# Patient Record
Sex: FEMALE | Race: WHITE | Employment: FULL TIME | URBAN - METROPOLITAN AREA
[De-identification: names, ages, dates, MRNs, and addresses within clinical notes are randomized per-mention and may not be internally consistent; named-entity substitution may affect disease eponyms.]

---

## 2018-10-15 LAB
HPV COMMENT: NORMAL
HPV TYPE 16: NOT DETECTED
HPV TYPE 18: NOT DETECTED
HPVOH (OTHER TYPES): NOT DETECTED

## 2021-01-20 ENCOUNTER — TELEPHONE (OUTPATIENT)
Dept: INTERNAL MEDICINE CLINIC | Age: 36
End: 2021-01-20

## 2021-01-20 NOTE — TELEPHONE ENCOUNTER
----- Message from Bryon Trevizomaximo sent at 1/20/2021  9:30 AM EST -----  Subject: Message to Provider    QUESTIONS  Information for Provider? Pt called and said she would like to schedule a   new pt appointment with  informed her she wasn't accepting at this   time. She said because she was a previous pt of hers if she would see her   again.  ---------------------------------------------------------------------------  --------------  CALL BACK INFO  What is the best way for the office to contact you? OK to leave message on   voicemail  Preferred Call Back Phone Number? [phone]  ---------------------------------------------------------------------------  --------------  SCRIPT ANSWERS  Relationship to Patient?  Self

## 2021-02-12 NOTE — TELEPHONE ENCOUNTER
Patient states Dr. Leonard Millan informed her mother via a VV this week, that she could do a NP VV to re-establish either this coming Tuesday or Next. Please advise regarding this, and what time patient should be scheduled as there are not 45 minutes appointments available on those days.

## 2021-02-15 NOTE — PROGRESS NOTES
Date of Visit:  2021    CC: Dalton Amaro (: 1985) is a 37 y.o. female, new patient, here for evaluation/re-evaluation of the following medical concerns:    ASSESSMENT/PLAN:  1. Essential hypertension  Assessment & Plan:  Inadequately controlled on current regimen. Since planning to have more children in the near future, will avoid ACE inhibitors and ARBs. Increase labetalol to 300 mg bid. She will come in for BP check and home cuff calibration in about 2 weeks. If still above goal, will increase to 400 mg bid, which is maximal dose. If additional agent needed, consider amlodipine or diuretic. Orders:  -     Comprehensive Metabolic Panel, Fasting; Future  -     TSH with Reflex; Future  2. Hyperlipidemia, unspecified hyperlipidemia type  Assessment & Plan:  Familial- will likely need medication after childbearing complete. Patient counseled on diet and exercise. Orders:  -     Lipid, Fasting; Future  3. Non-seasonal allergic rhinitis due to other allergic trigger  Assessment & Plan:  Inadequately controlled on nasal steroid alone. Suggested adding daily Claritin, but she would also appreciate consult with ENT- referral provided. Orders:  -     Adan Damon MD, Otolaryngology, [facility]  4. Acute non-recurrent sinusitis, unspecified location  Likely due to above. Although she has had a generalized reaction to PCN, she does tolerate cephalosporins. Since she is breastfeeding, will treat with Omnicef 600 mg qd. Patient was advised to take probiotic, such as DanActiv yogurt drink, Florastor or Culturelle, twice daily while on antibiotics and for 1 week after. She will call the office at the first sign of any allergic reaction to this medication, or 911 if any swelling of the mouth or throat or any SOB. Return in about 2 weeks (around 3/2/2021) for NURSE VISIT- BP check and cuff calibration.      Patient-Reported Vitals 2021   Patient-Reported Weight 240   Patient-Reported Systolic 833   Patient-Reported Diastolic 99   Patient-Reported Pulse 98        Wt Readings from Last 3 Encounters:   13 233 lb (105.7 kg)   12 222 lb (100.7 kg)   12 222 lb (100.7 kg)     BP Readings from Last 3 Encounters:   13 132/90   12 148/80   12 122/78     Estimated body mass index is 36.22 kg/m² as calculated from the following:    Height as of 3/16/12: 5' 7.25\" (1.708 m). Weight as of 13: 233 lb (105.7 kg). HPI  HTN:  Long history of borderline HTN, which first required medication about 2 years ago- losartan, switched to labetalol when started trying to conceive- 5 months postpartum now. Currently taking 200 mg bid, but BP still running 130-150s/80-90s. Was taking 400 mg bid during pregnancy. No other complications of pregnancy except heavy postpartum bleeding. Having intermittent HAs, ZIMMER, but no CP, dizziness, edema or visual changes. Significant FH of hypertension. Allergic Rhinitis:  Current treatment includes intranasal steroid- Flonase. Residual symptoms: clear rhinorrhea, headaches, itchy eyes, nasal congestion, pressure sensation in ears and sinus pressure. Has had greenish nasal discharge for the past 2 weeks. Currently breastfeeding. Hyperlipidemia:  Recently diagnosed with familial hyperlipidemia- saw lipid specialist who recommended waiting to treat until childbearing complete. Father and brother on aggressive lipid-lowering medication regimen. PGF  of MI at 43. Patient trying to follow Mediterranean Diet, but not able to exercise much. ROS  As documented above    Physical Exam  Constitutional:       General: She is not in acute distress. Appearance: She is well-developed. HENT:      Head: Normocephalic and atraumatic. Mouth/Throat:      Lips: No lesions. Neck:      Comments: No visible mass  Pulmonary:      Effort: Pulmonary effort is normal. No respiratory distress.    Skin:     Comments: No rash, erythema or other discoloration on facial skin and exposed areas of neck and upper extremites    Neurological:      Mental Status: She is alert. Comments: No facial asymmetry (cranial nerve 7 motor function) or gaze palsy   Psychiatric:         Attention and Perception: Attention normal.         Mood and Affect: Mood normal.         Speech: Speech normal.         Behavior: Behavior normal.         Thought Content: Thought content normal.         Cognition and Memory: Cognition normal.       On this date 02/16/21 I have spent 60 minutes reviewing previous notes, test results and face to face with the patient discussing the diagnosis and importance of compliance with the treatment plan as well as documenting on the day of the visit. Adrien Mccord is a 37 y.o. female being evaluated by a Virtual Visit (video visit) encounter to address concerns as mentioned above. A caregiver was present when appropriate. Due to this being a TeleHealth encounter (During ODKSE-28 public health emergency), evaluation of the following organ systems was limited: Vitals/Constitutional/EENT/Resp/CV/GI//MS/Neuro/Skin/Heme-Lymph-Imm. Pursuant to the emergency declaration under the [address] [place] [address] authority and the [employer] and [employer] Act, this Virtual Visit was conducted with patient's (and/or legal guardian's) consent, to reduce the patient's risk of exposure to COVID-19 and provide necessary medical care. The patient (and/or legal guardian) has also been advised to contact this office for worsening conditions or problems, and seek emergency medical treatment and/or call 911 if deemed necessary. Patient identification was verified at the start of the visit: Yes    Services were provided through a video synchronous discussion virtually to substitute for in-person clinic visit. Patient and provider were located at their individual homes.     --George Castro [address] MD on 2/16/2021 at 12:18 PM    An electronic signature was used to authenticate this note.

## 2021-02-16 ENCOUNTER — VIRTUAL VISIT (OUTPATIENT)
Dept: INTERNAL MEDICINE CLINIC | Age: 36
End: 2021-02-16
Payer: OTHER GOVERNMENT

## 2021-02-16 DIAGNOSIS — I10 ESSENTIAL HYPERTENSION: Primary | ICD-10-CM

## 2021-02-16 DIAGNOSIS — J30.89 NON-SEASONAL ALLERGIC RHINITIS DUE TO OTHER ALLERGIC TRIGGER: Chronic | ICD-10-CM

## 2021-02-16 DIAGNOSIS — E78.5 HYPERLIPIDEMIA, UNSPECIFIED HYPERLIPIDEMIA TYPE: ICD-10-CM

## 2021-02-16 DIAGNOSIS — J01.90 ACUTE NON-RECURRENT SINUSITIS, UNSPECIFIED LOCATION: ICD-10-CM

## 2021-02-16 PROCEDURE — 99204 OFFICE O/P NEW MOD 45 MIN: CPT | Performed by: INTERNAL MEDICINE

## 2021-02-16 RX ORDER — CEFDINIR 300 MG/1
300 CAPSULE ORAL 2 TIMES DAILY
Qty: 20 CAPSULE | Refills: 0 | Status: SHIPPED | OUTPATIENT
Start: 2021-02-16 | End: 2021-02-26

## 2021-02-16 RX ORDER — LABETALOL 100 MG/1
300 TABLET, FILM COATED ORAL 2 TIMES DAILY
COMMUNITY
End: 2021-05-11 | Stop reason: SDUPTHER

## 2021-02-16 ASSESSMENT — PATIENT HEALTH QUESTIONNAIRE - PHQ9
SUM OF ALL RESPONSES TO PHQ QUESTIONS 1-9: 1
SUM OF ALL RESPONSES TO PHQ9 QUESTIONS 1 & 2: 1
2. FEELING DOWN, DEPRESSED OR HOPELESS: 1
SUM OF ALL RESPONSES TO PHQ QUESTIONS 1-9: 1
1. LITTLE INTEREST OR PLEASURE IN DOING THINGS: 0

## 2021-02-16 NOTE — ASSESSMENT & PLAN NOTE
Familial- will likely need medication after childbearing complete. Patient counseled on diet and exercise.

## 2021-02-16 NOTE — ASSESSMENT & PLAN NOTE
Inadequately controlled on nasal steroid alone. Suggested adding daily Claritin, but she would also appreciate consult with ENT- referral provided.

## 2021-03-03 ENCOUNTER — NURSE ONLY (OUTPATIENT)
Dept: INTERNAL MEDICINE CLINIC | Age: 36
End: 2021-03-03

## 2021-03-03 VITALS — DIASTOLIC BLOOD PRESSURE: 90 MMHG | SYSTOLIC BLOOD PRESSURE: 144 MMHG | HEART RATE: 62 BPM

## 2021-03-03 DIAGNOSIS — I10 ESSENTIAL HYPERTENSION: Primary | ICD-10-CM

## 2021-03-03 NOTE — PROGRESS NOTES
Patient advised and will continue current medication. She has follow up appointment scheduled for mid April which she would like to keep.

## 2021-03-03 NOTE — PROGRESS NOTES
BP looks good on office cuff today- continue current dose of labetalol. However, her home cuff is inaccurate, so should be replaced. I need to see her back in about 3 months- have her bring new cuff to this appointment.

## 2021-03-08 ENCOUNTER — OFFICE VISIT (OUTPATIENT)
Dept: ENT CLINIC | Age: 36
End: 2021-03-08
Payer: OTHER GOVERNMENT

## 2021-03-08 VITALS — HEART RATE: 87 BPM | SYSTOLIC BLOOD PRESSURE: 142 MMHG | TEMPERATURE: 97.5 F | DIASTOLIC BLOOD PRESSURE: 99 MMHG

## 2021-03-08 DIAGNOSIS — J34.89 NASAL OBSTRUCTION: Primary | ICD-10-CM

## 2021-03-08 DIAGNOSIS — J35.01 CHRONIC TONSILLITIS: ICD-10-CM

## 2021-03-08 DIAGNOSIS — H93.8X3 SENSATION OF FULLNESS IN BOTH EARS: ICD-10-CM

## 2021-03-08 DIAGNOSIS — J31.0 CHRONIC RHINITIS: ICD-10-CM

## 2021-03-08 DIAGNOSIS — J32.0 CHRONIC MAXILLARY SINUSITIS: ICD-10-CM

## 2021-03-08 PROCEDURE — 99244 OFF/OP CNSLTJ NEW/EST MOD 40: CPT | Performed by: STUDENT IN AN ORGANIZED HEALTH CARE EDUCATION/TRAINING PROGRAM

## 2021-03-08 PROCEDURE — 31231 NASAL ENDOSCOPY DX: CPT | Performed by: STUDENT IN AN ORGANIZED HEALTH CARE EDUCATION/TRAINING PROGRAM

## 2021-03-08 RX ORDER — DOXYCYCLINE HYCLATE 100 MG
100 TABLET ORAL 2 TIMES DAILY
Qty: 20 TABLET | Refills: 0 | Status: SHIPPED | OUTPATIENT
Start: 2021-03-08 | End: 2021-03-18

## 2021-03-08 NOTE — PROGRESS NOTES
[facility] NECK SURGERY  CONSULT      Juliano Benitez (:  1985) is a 37 y.o. female, here for evaluation of the following chief complaint(s):  Sinus Problem (multiple sinus infections, snores, drainage, congestion, ear pain, facial pressure)      ASSESSMENT/PLAN:  1. Nasal obstruction  2. Chronic maxillary sinusitis  -     CT SINUS FOR IMAGE GUIDANCE; Future  3. Chronic rhinitis  4. Sensation of fullness in both ears  5. Chronic tonsillitis      This is a very pleasant 37 y.o. female here today for evaluation of the the above-noted complaints. On exam the patient has evidence of moderate sinonasal inflammation with purulence in the right middle meatus. I would like to prescribe her a new antibiotic, doxycycline 100 mg to be used twice daily. I will plan to obtain a posttreatment CT scan given her history of chronic sinus infections and exam findings. Depending on what this shows we can discuss further medical and/or surgical therapies. Plan to monitor her chronic tonsillitis going forward, and should her symptoms worsen we could discuss further treatment options. The risks, benefits and alternatives to antibiotics were discussed with patient in detail including but not limited to the risk of GI upset, xerostomia, anaphylaxis and rash/ sun sensitivity. The patient was instructed to contact me should they develop any adverse reactions or have other concerns. SUBJECTIVE/OBJECTIVE:  HPI    Juliano Benitez is here today for evaluation of issues related to her nose. The patient states that she gets multiple sinus infections per year. These usually last for several weeks at a time. She was recently seen by her primary care physician and prescribed Omnicef but feels like she is still symptomatic. She has difficulty breathing through her nose. She gets intermittent nasal drainage which can alternate from clear to greenish-yellow.   When she gets her sinus infections she also gets a sensation of fullness in her ears. She does not get nosebleeds. She has never had sinus imaging or nasal surgery. She has not use any medications in her nose. The patient also notes a history of chronic tonsil infections and tonsil stones. This is been going on for many years. Previous primary care doctor told her that she was not a candidate for tonsillectomy based on her age. REVIEW OF SYSTEMS  The following systems were reviewed and revealed the following in addition to any already discussed in the HPI:    PHYSICAL EXAM    GENERAL: No acute distress, alert and oriented, no hoarseness, strong voice  EYES: EOMI, Anti-icteric  HENT:   Head: Normocephalic and atraumatic. Face:  Symmetric, facial nerve intact, no sinus tenderness  Right Ear: Normal external ear, normal external auditory canal, intact tympanic membrane with normal mobility and aerated middle ear  Left Ear: Normal external ear, normal external auditory canal, intact tympanic membrane with normal mobility and aerated middle ear  Mouth/Oral Cavity:  normal lips, Uvula is midline, no mucosal lesions, no trismus, normal dentition, normal salivary quality/flow  Oropharynx/Larynx:  normal oropharynx, 2+ tonsils; patient did not tolerate mirror exam due to excessive gag reflex  Nose:Normal external nasal appearance. Anterior rhinoscopy shows  a deviated septum preventing view posteriorly. Mild hypertrophy turbinates. Normal mucosa   NECK: Normal range of motion, no thyromegaly, trachea is midline, no lymphadenopathy, no neck masses, no crepitus  CHEST: Normal respiratory effort, no retractions, breathing comfortably  SKIN: No rashes, normal appearing skin, no evidence of skin lesions/tumors  Neuro:  cranial nerve II-XII intact; normal gait  Cardio:  no edema        PROCEDURE  Nasal Endoscopy (CPT code 92882)    Preop: chronic rhinitis  Postop: Same    Verbal consent was received.  After topical anesthesia and decongestion had been obtained using aerosolized 1% lidocaine and oxymetazoline, a 45 degree rigid endoscope was placed into both nares with the patient in a sitting position. The following was observed:    Right Nasal Cavity and Paranasal Sinuses:  Polyp score = 0 (0 = no polyps, 1 = small polyps in middle meatus not reaching below the inferior border of the middle radu, 2 = polyps reaching below the middle border of the middle turbinate, 3= large polyps reaching the lower border of the inferior turbinate or polyps medial to the middle radu, 4= large polyps causing almost complete congestion/obstruction of the interior meatus)  Edema score = 1 (0 = absent, 1 = mild, 2 = severe)  Discharge score = 2 (0 = no discharge, 1 = clear thin discharge, 2 = thick purulent discharge)    Left Nasal Cavity and Paranasal Sinuses:    Polyp score = 0 (0 = no polyps, 1 = small polyps in middle meatus not reaching below the inferior border of the middle radu, 2 = polyps reaching below the middle border of the middle turbinate, 3= large polyps reaching the lower border of the inferior turbinate or polyps medial to the middle radu, 4= large polyps causing almost complete congestion/obstruction of the interior meatus)  Edema score = 1 (0 = absent, 1 = mild, 2 = severe)  Discharge score = 1 (0 = no discharge, 1 = clear thin discharge, 2 = thick purulent discharge)    Septum: intact and deviated high septal deviation with swell body  Other:   -The inferior and middle turbinates were examined. The middle meatus, and sphenoethmoid recess was examined bilaterally.    -There is evidence of purulence in the right middle meatus. No evidence of polyps mild turbinate hypertrophy.   -There were no complications. Tolerated well without complication. I attest that I was present for and did the entire procedure myself. This note was generated completely or in part utilizing Dragon dictation speech recognition software.   Occasionally, words are mistranscribed and despite editing, the text may contain inaccuracies due to incorrect word recognition. If further clarification is needed please contact the office at [phone]. An electronic signature was used to authenticate this note.     --Dung Guerrier MD

## 2021-03-11 ENCOUNTER — HOSPITAL ENCOUNTER (OUTPATIENT)
Dept: CT IMAGING | Age: 36
Discharge: HOME | End: 2021-03-11
Payer: OTHER GOVERNMENT

## 2021-03-11 DIAGNOSIS — J32.0 CHRONIC MAXILLARY SINUSITIS: ICD-10-CM

## 2021-03-11 PROCEDURE — 70486 CT MAXILLOFACIAL W/O DYE: CPT

## 2021-03-16 ENCOUNTER — OFFICE VISIT (OUTPATIENT)
Dept: ENT CLINIC | Age: 36
End: 2021-03-16
Payer: OTHER GOVERNMENT

## 2021-03-16 VITALS — DIASTOLIC BLOOD PRESSURE: 106 MMHG | SYSTOLIC BLOOD PRESSURE: 156 MMHG | TEMPERATURE: 97.7 F | HEART RATE: 77 BPM

## 2021-03-16 DIAGNOSIS — H93.8X3 SENSATION OF FULLNESS IN BOTH EARS: ICD-10-CM

## 2021-03-16 DIAGNOSIS — J32.2 CHRONIC ETHMOIDAL SINUSITIS: ICD-10-CM

## 2021-03-16 DIAGNOSIS — J34.89 NASAL OBSTRUCTION: Primary | ICD-10-CM

## 2021-03-16 DIAGNOSIS — J35.01 CHRONIC TONSILLITIS: ICD-10-CM

## 2021-03-16 DIAGNOSIS — J31.0 CHRONIC RHINITIS: ICD-10-CM

## 2021-03-16 DIAGNOSIS — J34.89 NASAL VALVE COLLAPSE: ICD-10-CM

## 2021-03-16 PROCEDURE — 99214 OFFICE O/P EST MOD 30 MIN: CPT | Performed by: STUDENT IN AN ORGANIZED HEALTH CARE EDUCATION/TRAINING PROGRAM

## 2021-03-16 RX ORDER — FLUTICASONE PROPIONATE 50 MCG
2 SPRAY, SUSPENSION (ML) NASAL 2 TIMES DAILY
Qty: 2 BOTTLE | Refills: 5 | Status: ON HOLD | OUTPATIENT
Start: 2021-03-16 | End: 2021-06-14 | Stop reason: HOSPADM

## 2021-03-16 NOTE — PROGRESS NOTES
[address]  (:  1985) is a 37 y.o. female, here for evaluation of the following chief complaint(s): Other (CT Results )      ASSESSMENT/PLAN:  1. Nasal obstruction  2. Chronic rhinitis  3. Sensation of fullness in both ears  4. Chronic tonsillitis  5. Chronic ethmoidal sinusitis  6. Nasal valve collapse      This is a very pleasant 37 y.o. female here today for evaluation of the the above-noted complaints. I independently reviewed her post treatment CT sinus scan with her today and it shows evidence of bilateral ethmoid sinus inflammation as well as a right-sided septal deviation and inferior turbinate hypertrophy. The ostiomeatal complexes are also obstructed bilaterally right greater than left. Additionally, with a modified caudal maneuver she experienced improvement of her nasal breathing bilaterally left greater than right. We discussed different treatment options for her symptoms and decided to proceed with conservative medical therapy of twice daily sinus irrigations and nasal steroid sprays. Depending on how she does with this, we could consider her for surgery, but I would like to try to manage her medically at this point. If she were to need to get surgery then she would need bilateral sinus surgery, septoplasty, inferior turbinate reduction and open septorhinoplasty to address her narrow nasal valves. Plan to monitor her chronic tonsillitis going forward, and should her symptoms worsen we could discuss further treatment options. The risks, benefits and alternatives to intranasal steroid use were discussed with the patient in detail, including the risks of burning, dryness, crusting, and occasional nosebleeds. Additional rare risks include septal perforations, mucosal atrophy, contact dermatitis as well as the potential risk of glaucoma or cataracts with sustained and prolonged use.  The patient expressed understanding of the risks and wished to proceed with therapy. SUBJECTIVE/OBJECTIVE:  [facility]    Juan Antonio Alonzo is here today for evaluation of issues related to her nose. The patient states that she gets multiple sinus infections per year. These usually last for several weeks at a time. She was recently seen by her primary care physician and prescribed Omnicef but feels like she is still symptomatic. She has difficulty breathing through her nose. She gets intermittent nasal drainage which can alternate from clear to greenish-yellow. When she gets her sinus infections she also gets a sensation of fullness in her ears. She does not get nosebleeds. She has never had sinus imaging or nasal surgery. She has not use any medications in her nose. The patient also notes a history of chronic tonsil infections and tonsil stones. This is been going on for many years. Previous primary care doctor told her that she was not a candidate for tonsillectomy based on her age. Update 3/16/2021:    Patient presents today for follow-up. She is still experiencing some difficulties breathing through her nose greater on the left than the right. She gets some consistent nasal drainage. Her symptoms are not improved since she had antibiotics. REVIEW OF SYSTEMS  The following systems were reviewed and revealed the following in addition to any already discussed in the HPI:    PHYSICAL EXAM    GENERAL: No acute distress, alert and oriented, no hoarseness, strong voice  EYES: EOMI, Anti-icteric  HENT:   Head: Normocephalic and atraumatic.    Face:  Symmetric, facial nerve intact, no sinus tenderness  Right Ear: Normal external ear, normal external auditory canal, intact tympanic membrane with normal mobility and aerated middle ear  Left Ear: Normal external ear, normal external auditory canal, intact tympanic membrane with normal mobility and aerated middle ear  Mouth/Oral Cavity:  normal lips, Uvula is midline, no mucosal lesions, no trismus, normal dentition, normal salivary quality/flow  Oropharynx/Larynx:  normal oropharynx, 2+ tonsils; patient did not tolerate mirror exam due to excessive gag reflex  Nose:Normal external nasal appearance. Anterior rhinoscopy shows  a deviated septum preventing view posteriorly. Mild hypertrophy turbinates. Normal mucosa   NECK: Normal range of motion, no thyromegaly, trachea is midline, no lymphadenopathy, no neck masses, no crepitus  CHEST: Normal respiratory effort, no retractions, breathing comfortably  SKIN: No rashes, normal appearing skin, no evidence of skin lesions/tumors  Neuro:  cranial nerve II-XII intact; normal gait  Cardio:  no edema        PROCEDURE  Nasal Endoscopy (CPT code 57827) from initial visit    Preop: chronic rhinitis  Postop: Same    Verbal consent was received. After topical anesthesia and decongestion had been obtained using aerosolized 1% lidocaine and oxymetazoline, a 45 degree rigid endoscope was placed into both nares with the patient in a sitting position.  The following was observed:    Right Nasal Cavity and Paranasal Sinuses:  Polyp score = 0 (0 = no polyps, 1 = small polyps in middle meatus not reaching below the inferior border of the middle radu, 2 = polyps reaching below the middle border of the middle turbinate, 3= large polyps reaching the lower border of the inferior turbinate or polyps medial to the middle radu, 4= large polyps causing almost complete congestion/obstruction of the interior meatus)  Edema score = 1 (0 = absent, 1 = mild, 2 = severe)  Discharge score = 2 (0 = no discharge, 1 = clear thin discharge, 2 = thick purulent discharge)    Left Nasal Cavity and Paranasal Sinuses:    Polyp score = 0 (0 = no polyps, 1 = small polyps in middle meatus not reaching below the inferior border of the middle radu, 2 = polyps reaching below the middle border of the middle turbinate, 3= large polyps reaching the lower border of the inferior turbinate

## 2021-05-03 ENCOUNTER — OFFICE VISIT (OUTPATIENT)
Dept: ENT CLINIC | Age: 36
End: 2021-05-03
Payer: OTHER GOVERNMENT

## 2021-05-03 VITALS — SYSTOLIC BLOOD PRESSURE: 140 MMHG | TEMPERATURE: 97.8 F | DIASTOLIC BLOOD PRESSURE: 98 MMHG | HEART RATE: 74 BPM

## 2021-05-03 DIAGNOSIS — J34.89 NASAL VALVE COLLAPSE: ICD-10-CM

## 2021-05-03 DIAGNOSIS — H93.8X3 SENSATION OF FULLNESS IN BOTH EARS: ICD-10-CM

## 2021-05-03 DIAGNOSIS — J31.0 CHRONIC RHINITIS: ICD-10-CM

## 2021-05-03 DIAGNOSIS — J35.01 CHRONIC TONSILLITIS: ICD-10-CM

## 2021-05-03 DIAGNOSIS — J34.89 NASAL OBSTRUCTION: Primary | ICD-10-CM

## 2021-05-03 DIAGNOSIS — J32.2 CHRONIC ETHMOIDAL SINUSITIS: ICD-10-CM

## 2021-05-03 DIAGNOSIS — J32.0 CHRONIC MAXILLARY SINUSITIS: ICD-10-CM

## 2021-05-03 PROCEDURE — 99214 OFFICE O/P EST MOD 30 MIN: CPT | Performed by: STUDENT IN AN ORGANIZED HEALTH CARE EDUCATION/TRAINING PROGRAM

## 2021-05-03 NOTE — PROGRESS NOTES
[address]  (:  1985) is a 37 y.o. female, here for evaluation of the following chief complaint(s):  No chief complaint on file. ASSESSMENT/PLAN:  1. Nasal obstruction  2. Chronic rhinitis  3. Sensation of fullness in both ears  4. Chronic tonsillitis  5. Chronic ethmoidal sinusitis  6. Nasal valve collapse  7. Chronic maxillary sinusitis      This is a very pleasant 37 y.o. female here today for evaluation of the the above-noted complaints. I independently reviewed her post treatment CT sinus scan with her today and it shows evidence of bilateral ethmoid sinus inflammation as well as a right-sided septal deviation and inferior turbinate hypertrophy. The ostiomeatal complexes are also obstructed bilaterally right greater than left. Additionally, with a modified caudal maneuver she experienced improvement of her nasal breathing bilaterally left greater than right. She has trialed appropriate medical therapy including antibiotics, nasal steroid sprays and sinonasal irrigations. Despite this she continues to remain very symptomatic. I discussed with the patient different treatment options including continued medical therapy with nasal steroid sprays and irrigations versus surgery to improve her nasal airway. She is very interested in proceeding with surgery and I think this is reasonable. We discussed that based on her radiographic and clinical exam findings I feel that she would benefit from endoscopic sinus surgery as well as an open septorhinoplasty to address her nasal valve collapse. -Given the above, will plan for Bilateral functional endoscopic sinus surgery with possible maxillary antrostomy, anterior/posterior ethmoidectomies, frontal sinusotomy and sphenoidotomy with removal of tissue.   We will also plan to do an open septorhinoplasty.  -Risks of sinus surgery include anosmia/hyposmia, dysgeusia, hemorrhage, pain, infection/inflammation, poor cosmetic outcome or changes to appearance of her nose,continued nasal obstruction, numbness to the maxillary tissues or dentition, CSF leak (with risk of meningitis or intracranial infection), orbital complications (diplopia, blurry vision, blindness), need for further procedures  -Anesthesia carries its own complications which will need to be discussed with the Anesthesiology team on the day of surgery  -Patient will not need to obtain PCP clearance prior to surgery, as she is young and healthy. SUBJECTIVE/OBJECTIVE:  Mindy Chan is here today for evaluation of issues related to her nose. The patient states that she gets multiple sinus infections per year. These usually last for several weeks at a time. She was recently seen by her primary care physician and prescribed Omnicef but feels like she is still symptomatic. She has difficulty breathing through her nose. She gets intermittent nasal drainage which can alternate from clear to greenish-yellow. When she gets her sinus infections she also gets a sensation of fullness in her ears. She does not get nosebleeds. She has never had sinus imaging or nasal surgery. She has not use any medications in her nose. The patient also notes a history of chronic tonsil infections and tonsil stones. This is been going on for many years. Previous primary care doctor told her that she was not a candidate for tonsillectomy based on her age. Update 3/16/2021:    Patient presents today for follow-up. She is still experiencing some difficulties breathing through her nose greater on the left than the right. She gets some consistent nasal drainage. Her symptoms are not improved since she had antibiotics. Update 5/3/2021:    Patient presents today for follow-up. She is still getting significant nasal obstruction. She is continue to use her nasal steroid sprays and irrigations.     REVIEW OF SYSTEMS  The following systems were reviewed and revealed the following in addition to any already discussed in the HPI:    PHYSICAL EXAM    GENERAL: No acute distress, alert and oriented, no hoarseness, strong voice  EYES: EOMI, Anti-icteric  HENT:   Head: Normocephalic and atraumatic. Face:  Symmetric, facial nerve intact, no sinus tenderness  Right Ear: Normal external ear, normal external auditory canal, intact tympanic membrane with normal mobility and aerated middle ear  Left Ear: Normal external ear, normal external auditory canal, intact tympanic membrane with normal mobility and aerated middle ear  Mouth/Oral Cavity:  normal lips, Uvula is midline, no mucosal lesions, no trismus, normal dentition, normal salivary quality/flow  Oropharynx/Larynx:  normal oropharynx, 2+ tonsils; patient did not tolerate mirror exam due to excessive gag reflex  Nose:Normal external nasal appearance. Anterior rhinoscopy shows  a deviated septum preventing view posteriorly. Mild hypertrophy turbinates. Normal mucosa   NECK: Normal range of motion, no thyromegaly, trachea is midline, no lymphadenopathy, no neck masses, no crepitus  CHEST: Normal respiratory effort, no retractions, breathing comfortably  SKIN: No rashes, normal appearing skin, no evidence of skin lesions/tumors  Neuro:  cranial nerve II-XII intact; normal gait  Cardio:  no edema        PROCEDURE  Nasal Endoscopy (CPT code 98548) from initial visit    Preop: chronic rhinitis  Postop: Same    Verbal consent was received. After topical anesthesia and decongestion had been obtained using aerosolized 1% lidocaine and oxymetazoline, a 45 degree rigid endoscope was placed into both nares with the patient in a sitting position.  The following was observed:    Right Nasal Cavity and Paranasal Sinuses:  Polyp score = 0 (0 = no polyps, 1 = small polyps in middle meatus not reaching below the inferior border of the middle radu, 2 = polyps reaching below the middle border of the middle turbinate, 3= large polyps reaching the lower border of the inferior turbinate or polyps medial to the middle radu, 4= large polyps causing almost complete congestion/obstruction of the interior meatus)  Edema score = 1 (0 = absent, 1 = mild, 2 = severe)  Discharge score = 2 (0 = no discharge, 1 = clear thin discharge, 2 = thick purulent discharge)    Left Nasal Cavity and Paranasal Sinuses:    Polyp score = 0 (0 = no polyps, 1 = small polyps in middle meatus not reaching below the inferior border of the middle radu, 2 = polyps reaching below the middle border of the middle turbinate, 3= large polyps reaching the lower border of the inferior turbinate or polyps medial to the middle radu, 4= large polyps causing almost complete congestion/obstruction of the interior meatus)  Edema score = 1 (0 = absent, 1 = mild, 2 = severe)  Discharge score = 1 (0 = no discharge, 1 = clear thin discharge, 2 = thick purulent discharge)    Septum: intact and deviated high septal deviation with swell body  Other:   -The inferior and middle turbinates were examined. The middle meatus, and sphenoethmoid recess was examined bilaterally.    -There is evidence of purulence in the right middle meatus. No evidence of polyps mild turbinate hypertrophy.   -There were no complications. Tolerated well without complication. I attest that I was present for and did the entire procedure myself. This note was generated completely or in part utilizing Dragon dictation speech recognition software. Occasionally, words are mistranscribed and despite editing, the text may contain inaccuracies due to incorrect word recognition. If further clarification is needed please contact the office at [phone]. An electronic signature was used to authenticate this note.     --Amanda Varela MD

## 2021-05-05 ENCOUNTER — OFFICE VISIT (OUTPATIENT)
Dept: INTERNAL MEDICINE CLINIC | Age: 36
End: 2021-05-05
Payer: OTHER GOVERNMENT

## 2021-05-05 VITALS
HEART RATE: 76 BPM | TEMPERATURE: 98.7 F | RESPIRATION RATE: 16 BRPM | WEIGHT: 250 LBS | BODY MASS INDEX: 38.86 KG/M2 | SYSTOLIC BLOOD PRESSURE: 124 MMHG | DIASTOLIC BLOOD PRESSURE: 70 MMHG

## 2021-05-05 DIAGNOSIS — H60.392 OTHER INFECTIVE OTITIS EXTERNA OF LEFT EAR, UNSPECIFIED CHRONICITY: Primary | ICD-10-CM

## 2021-05-05 PROCEDURE — 99213 OFFICE O/P EST LOW 20 MIN: CPT | Performed by: INTERNAL MEDICINE

## 2021-05-05 RX ORDER — LEVOFLOXACIN 500 MG/1
500 TABLET, FILM COATED ORAL DAILY
Qty: 7 TABLET | Refills: 0 | Status: CANCELLED | OUTPATIENT
Start: 2021-05-05 | End: 2021-05-12

## 2021-05-05 RX ORDER — CIPROFLOXACIN 500 MG/1
500 TABLET, FILM COATED ORAL 2 TIMES DAILY
Qty: 14 TABLET | Refills: 0 | Status: SHIPPED | OUTPATIENT
Start: 2021-05-05 | End: 2021-05-12

## 2021-05-05 NOTE — PROGRESS NOTES
distress. HENT:      Right Ear: Hearing, tympanic membrane, ear canal and external ear normal.      Left Ear: Hearing and tympanic membrane normal. Swelling (of tragus) and tenderness present. Ears:     Lymphadenopathy:      Cervical: No cervical adenopathy. Neurological:      Mental Status: She is alert. On this date  I have spent 20 minutes reviewing previous notes, test results and face to face with the patient discussing the diagnosis and importance of compliance with the treatment plan as well as documenting on the day of the visit. This note was generated completely or in part utilizing Dragon dictation speech recognition software. Occasionally, words are mistranscribed and despite editing, the text may contain inaccuracies due to incorrect word recognition. If further clarification is needed please contact the office at [phone]          An electronic signature was used to authenticate this note.     --Esteban Salas MD

## 2021-05-11 ENCOUNTER — TELEPHONE (OUTPATIENT)
Dept: INTERNAL MEDICINE CLINIC | Age: 36
End: 2021-05-11

## 2021-05-11 RX ORDER — LABETALOL 300 MG/1
300 TABLET, FILM COATED ORAL 2 TIMES DAILY
Qty: 60 TABLET | Refills: 0 | Status: SHIPPED | OUTPATIENT
Start: 2021-05-11 | End: 2021-11-19 | Stop reason: SDUPTHER

## 2021-05-11 NOTE — TELEPHONE ENCOUNTER
Patient is requesting the following prescription refill which was initially prescribed by her former pcp, so this would be Dr. Lupe Sanchez 1st time filling this:  Last appointment: 2/16/2021  Next appointment: Visit date not found  Last refill: Never filled by Dr. Tamar Salinas, (filled by prior pcp) and dosage was changed after a nurse visit for a blood pressure reading in the office. labetalol (NORMODYNE) 100 MG tablet Take 200 mg by mouth 2 times daily     Patient states this dosage was changed to: 300 mg twice daily. [address], [address] - F [phone]     Patient states she is completely out of this medication and is requesting this please be done today. Aware doctor  is out of the office today. Patient can be reached @ phone # provided should there be any questions.

## 2021-05-11 NOTE — TELEPHONE ENCOUNTER
Let her know the prescription has been ordered.   I want her to be aware that you are to 300 mg tabs so she will only take 1 twice daily

## 2021-05-24 ENCOUNTER — OFFICE VISIT (OUTPATIENT)
Dept: ENT CLINIC | Age: 36
End: 2021-05-24

## 2021-05-24 VITALS — TEMPERATURE: 98.2 F | HEART RATE: 86 BPM | DIASTOLIC BLOOD PRESSURE: 84 MMHG | SYSTOLIC BLOOD PRESSURE: 122 MMHG

## 2021-05-24 DIAGNOSIS — J32.2 CHRONIC ETHMOIDAL SINUSITIS: ICD-10-CM

## 2021-05-24 DIAGNOSIS — J34.89 NASAL VALVE COLLAPSE: ICD-10-CM

## 2021-05-24 DIAGNOSIS — J34.89 NASAL OBSTRUCTION: Primary | ICD-10-CM

## 2021-05-24 DIAGNOSIS — J31.0 CHRONIC RHINITIS: ICD-10-CM

## 2021-05-24 DIAGNOSIS — H93.8X3 SENSATION OF FULLNESS IN BOTH EARS: ICD-10-CM

## 2021-05-24 PROCEDURE — 99024 POSTOP FOLLOW-UP VISIT: CPT | Performed by: STUDENT IN AN ORGANIZED HEALTH CARE EDUCATION/TRAINING PROGRAM

## 2021-05-24 NOTE — PROGRESS NOTES
her chronic sinonasal complaints. She has not had no change since I saw her last.    REVIEW OF SYSTEMS  The following systems were reviewed and revealed the following in addition to any already discussed in the HPI:    PHYSICAL EXAM    GENERAL: No acute distress, alert and oriented, no hoarseness, strong voice  EYES: EOMI, Anti-icteric  HENT:   Head: Normocephalic and atraumatic. Face:  Symmetric, facial nerve intact, no sinus tenderness  Right Ear: Normal external ear, normal external auditory canal, intact tympanic membrane with normal mobility and aerated middle ear  Left Ear: Normal external ear, normal external auditory canal, intact tympanic membrane with normal mobility and aerated middle ear  Mouth/Oral Cavity:  normal lips, Uvula is midline, no mucosal lesions, no trismus, normal dentition, normal salivary quality/flow  Oropharynx/Larynx:  normal oropharynx, 2+ tonsils; patient did not tolerate mirror exam due to excessive gag reflex  Nose:Normal external nasal appearance. Anterior rhinoscopy shows  a deviated septum preventing view posteriorly. Mild hypertrophy turbinates. Normal mucosa   NECK: Normal range of motion, no thyromegaly, trachea is midline, no lymphadenopathy, no neck masses, no crepitus  CHEST: Normal respiratory effort, no retractions, breathing comfortably  SKIN: No rashes, normal appearing skin, no evidence of skin lesions/tumors  Neuro:  cranial nerve II-XII intact; normal gait  Cardio:  no edema        PROCEDURE  Nasal Endoscopy (CPT code 19074) from initial visit    Preop: chronic rhinitis  Postop: Same    Verbal consent was received. After topical anesthesia and decongestion had been obtained using aerosolized 1% lidocaine and oxymetazoline, a 45 degree rigid endoscope was placed into both nares with the patient in a sitting position.  The following was observed:    Right Nasal Cavity and Paranasal Sinuses:  Polyp score = 0 (0 = no polyps, 1 = small polyps in middle meatus not reaching below the inferior border of the middle radu, 2 = polyps reaching below the middle border of the middle turbinate, 3= large polyps reaching the lower border of the inferior turbinate or polyps medial to the middle radu, 4= large polyps causing almost complete congestion/obstruction of the interior meatus)  Edema score = 1 (0 = absent, 1 = mild, 2 = severe)  Discharge score = 2 (0 = no discharge, 1 = clear thin discharge, 2 = thick purulent discharge)    Left Nasal Cavity and Paranasal Sinuses:    Polyp score = 0 (0 = no polyps, 1 = small polyps in middle meatus not reaching below the inferior border of the middle radu, 2 = polyps reaching below the middle border of the middle turbinate, 3= large polyps reaching the lower border of the inferior turbinate or polyps medial to the middle radu, 4= large polyps causing almost complete congestion/obstruction of the interior meatus)  Edema score = 1 (0 = absent, 1 = mild, 2 = severe)  Discharge score = 1 (0 = no discharge, 1 = clear thin discharge, 2 = thick purulent discharge)    Septum: intact and deviated high septal deviation with swell body  Other:   -The inferior and middle turbinates were examined. The middle meatus, and sphenoethmoid recess was examined bilaterally.    -There is evidence of purulence in the right middle meatus. No evidence of polyps mild turbinate hypertrophy. Modified [place] maneuver improves nasal airway breathing left greater than right.  -There were no complications. Tolerated well without complication. I attest that I was present for and did the entire procedure myself. This note was generated completely or in part utilizing Dragon dictation speech recognition software. Occasionally, words are mistranscribed and despite editing, the text may contain inaccuracies due to incorrect word recognition. If further clarification is needed please contact the office at [phone].     An electronic signature was used

## 2021-06-10 ENCOUNTER — OFFICE VISIT (OUTPATIENT)
Dept: PRIMARY CARE CLINIC | Age: 36
End: 2021-06-10
Payer: OTHER GOVERNMENT

## 2021-06-10 DIAGNOSIS — Z20.828 EXPOSURE TO SARS-ASSOCIATED CORONAVIRUS: Primary | ICD-10-CM

## 2021-06-10 LAB — SARS-COV-2: NOT DETECTED

## 2021-06-10 PROCEDURE — 99211 OFF/OP EST MAY X REQ PHY/QHP: CPT | Performed by: NURSE PRACTITIONER

## 2021-06-10 NOTE — PATIENT INSTRUCTIONS
You have received a viral test for COVID-19. Below is education on quarantine per the CDC guidelines. For any symptoms, seek care from your PCP, call [phone] to establish care with a doctor, or go directly to an urgent care or the emergency room. Test results will take 2-7 days and will be sent to you in your [employer] account. If you test positive, you will be contacted via phone. If you test negative, the ONLY communication will be through [address] GO TO [place] AND SIGN UP FOR [employer]  (LOWER LEFT OF THE HOME PAGE)  No test is 100%. If you have symptoms, you should follow the guidance of quarantine as previously stated. You can still be contagious if you have symptoms. Your [facility] will reach out to you if you have a positive result. They will provide you with a return to work date and note. If you were tested for a pre-op, then you should remain in quarantine until your procedure. How do I know if I need to be in quarantine? If you live in a community where COVID-19 is or might be spreading (currently, that is virtually everywhere in the [place])  Be alert for symptoms. Watch for fever, cough, shortness of breath, or other symptoms of COVID-19.  Take your temperature if symptoms develop.  Practice social distancing. Maintain 6 feet of distance from others and stay out of crowded places.  Follow CDC guidance if symptoms develop. If you feel healthy but:   Recently had close contact with a person with COVID-19 you need to Quarantine:   Stay home until 14 days after your last exposure.  Check your temperature twice a day and watch for symptoms of COVID-19.  If possible, stay away from people who are at higher-risk for getting very sick from COVID-19.   Stay Home and Monitor Your Health if you:   Have been diagnosed with COVID-19, or   Are waiting for test results, or   Have cough, fever, or shortness of breath, or symptoms of COVID-19      When You Can

## 2021-06-10 NOTE — PROGRESS NOTES
Name_______________________________________Printed:____________________  Date and time of surgery________6/14/21 1200_______________Arrival Time:______1030 masc__________   1. The instructions given regarding when and if a patient needs to stop oral intake prior to surgery varies. Follow the specific instructions you were given                  _x__Nothing to eat or to drink after Midnight the night before.                   ____Carbo loading or ERAS instructions will be given to select patients-if you have been given those instructions -please do the following                           The evening before your surgery after dinner before midnight drink 40 ounces of gatorade. If you are diabetic use sugar free. The morning of surgery drink 40 ounces of water. This needs to be finished 3 hours prior to your surgery start time. 2. Take the following pills with a small sip of water on the morning of surgery________labetalol___________________________________________                  Do not take blood pressure medications ending in pril or sartan the shamar prior to surgery or the morning of surgery_   3. Aspirin, Ibuprofen, Advil, Naproxen, Vitamin E and other Anti-inflammatory products and supplements should be stopped for 5 -7days before surgery or as directed by your physician. 4. Check with your Doctor regarding stopping Plavix, Coumadin,Eliquis, Lovenox,Effient,Pradaxa,Xarelto, Fragmin or other blood thinners and follow their instructions. 5. Do not smoke, and do not drink any alcoholic beverages 24 hours prior to surgery. This includes NA Beer. Refrain from the usage of any recreational drugs. 6. You may brush your teeth and gargle the morning of surgery. DO NOT SWALLOW WATER   7. You MUST make arrangements for a responsible adult to stay on site while you are here and take you home after your surgery. You will not be allowed to leave alone or drive yourself home.   It is strongly suggested someone stay with you the first 24 hrs. Your surgery will be cancelled if you do not have a ride home. 8. A parent/legal guardian must accompany a child scheduled for surgery and plan to stay at the hospital until the child is discharged. Please do not bring other children with you. 9. Please wear simple, loose fitting clothing to the hospital.  Grady Cheese not bring valuables (money, credit cards, checkbooks, etc.) Do not wear any makeup (including no eye makeup) or nail polish on your fingers or toes. 10. DO NOT wear any jewelry or piercings on day of surgery. All body piercing jewelry must be removed. 11. If you have ___dentures, they will be removed before going to the OR; we will provide you a container. If you wear ___contact lenses or ___glasses, they will be removed; please bring a case for them. 12. Please see your family doctor/pediatrician for a history & physical and/or concerning medications. Bring any test results/reports from your physician's office. PCP________x__________Phone___________H&P Appt. Date________             13 If you  have a Living Will and Durable Power of  for Healthcare, please bring in a copy. 15. Notify your Surgeon if you develop any illness between now and surgery  time, cough, cold, fever, sore throat, nausea, vomiting, etc.  Please notify your surgeon if you experience dizziness, shortness of breath or blurred vision between now & the time of your surgery             15. DO NOT shave your operative site 96 hours prior to surgery. For face & neck surgery, men may use an electric razor 48 hours prior to surgery. 16. Shower the night before or morning of surgery using an antibacterial soap or as you have been instructed. 17. To provide excellent care visitors will be limited to one in the room at any given time. 18.  Please bring picture ID and insurance card.              19.  Visit our web site for additional information:  [email or web address]              20.During flu season no children under the age of 15 are permitted in the hospital for the safety of all patients. 21. If you take a long acting insulin in the evening only  take half of your usual  dose the night  before your procedure              22. If you use a c-pap please bring DOS if staying overnight,             23.For your convenience [facility] has a pharmacy on site to fill your prescriptions. 24. If you use oxygen and have a portable tank please bring it  with you the DOS             25. Bring a complete list of all your medications with name and dose include any supplements. 26. Other__________________________________________   *Please call pre admission testing if you any further questions   Igor Edmonds   [address]    [place] [id number]  [phone]   [facility] [address] CHAVEZ   [phone]           COVID TESTING    _x_ Done 6/10/21 -Where [facility]  __ Scheduled ___ Where ___   __ Other __________  __ VACCINATED-instructed to bring card DOS      VISITOR POLICY(subject to change)    There is a one visitor policy at [facility] for all surgeries and endoscopies. Whether the visitor can stay or will be asked to wait in the car will depend on the current policy and if social distancing can be maintained. The policy is subject to change at any time. Please make sure the visitor has a cell phone that is on,charged and able to accept calls, as this may be the way that the staff communicates with them. Pain management is NO VISITOR policyThe patients ride is expected to remain in the car with a cell phone for communication. If the ride is leaving the hospital grounds please make sure they are back in time for pickup. Have the patient inform the staff on arrival what their rides plans are while the patient is in the facility. At the MAIN there is one visitor allowed. Please note that the visitor policy is subject to change. All above information reviewed with patient in person or by phone. Patient verbalizes understanding. All questions and concerns addressed.                                                                                                  Patient/Rep_________pt___________                                                                                                                                    PRE OP INSTRUCTIONS

## 2021-06-14 ENCOUNTER — ANESTHESIA (OUTPATIENT)
Dept: OPERATING ROOM | Age: 36
End: 2021-06-14
Payer: OTHER GOVERNMENT

## 2021-06-14 ENCOUNTER — ANESTHESIA EVENT (OUTPATIENT)
Dept: OPERATING ROOM | Age: 36
End: 2021-06-14
Payer: OTHER GOVERNMENT

## 2021-06-14 ENCOUNTER — HOSPITAL ENCOUNTER (OUTPATIENT)
Age: 36
Setting detail: OUTPATIENT SURGERY
Discharge: HOME | End: 2021-06-14
Attending: STUDENT IN AN ORGANIZED HEALTH CARE EDUCATION/TRAINING PROGRAM | Admitting: STUDENT IN AN ORGANIZED HEALTH CARE EDUCATION/TRAINING PROGRAM
Payer: OTHER GOVERNMENT

## 2021-06-14 VITALS
RESPIRATION RATE: 1 BRPM | SYSTOLIC BLOOD PRESSURE: 111 MMHG | TEMPERATURE: 97.2 F | OXYGEN SATURATION: 98 % | DIASTOLIC BLOOD PRESSURE: 59 MMHG

## 2021-06-14 VITALS
WEIGHT: 244 LBS | SYSTOLIC BLOOD PRESSURE: 91 MMHG | HEIGHT: 68 IN | HEART RATE: 71 BPM | DIASTOLIC BLOOD PRESSURE: 60 MMHG | OXYGEN SATURATION: 96 % | BODY MASS INDEX: 36.98 KG/M2 | RESPIRATION RATE: 17 BRPM | TEMPERATURE: 97.8 F

## 2021-06-14 DIAGNOSIS — G89.18 POST-OP PAIN: Primary | ICD-10-CM

## 2021-06-14 LAB — HCG(URINE) PREGNANCY TEST: NEGATIVE

## 2021-06-14 PROCEDURE — 30140 RESECT INFERIOR TURBINATE: CPT | Performed by: STUDENT IN AN ORGANIZED HEALTH CARE EDUCATION/TRAINING PROGRAM

## 2021-06-14 PROCEDURE — 6370000000 HC RX 637 (ALT 250 FOR IP): Performed by: ANESTHESIOLOGY

## 2021-06-14 PROCEDURE — 7100000001 HC PACU RECOVERY - ADDTL 15 MIN: Performed by: STUDENT IN AN ORGANIZED HEALTH CARE EDUCATION/TRAINING PROGRAM

## 2021-06-14 PROCEDURE — 2580000003 HC RX 258: Performed by: ANESTHESIOLOGY

## 2021-06-14 PROCEDURE — 2720000010 HC SURG SUPPLY STERILE: Performed by: STUDENT IN AN ORGANIZED HEALTH CARE EDUCATION/TRAINING PROGRAM

## 2021-06-14 PROCEDURE — 6370000000 HC RX 637 (ALT 250 FOR IP): Performed by: STUDENT IN AN ORGANIZED HEALTH CARE EDUCATION/TRAINING PROGRAM

## 2021-06-14 PROCEDURE — 2580000003 HC RX 258: Performed by: NURSE ANESTHETIST, CERTIFIED REGISTERED

## 2021-06-14 PROCEDURE — 3600000004 HC SURGERY LEVEL 4 BASE: Performed by: STUDENT IN AN ORGANIZED HEALTH CARE EDUCATION/TRAINING PROGRAM

## 2021-06-14 PROCEDURE — 30465 REPAIR NASAL STENOSIS: CPT | Performed by: STUDENT IN AN ORGANIZED HEALTH CARE EDUCATION/TRAINING PROGRAM

## 2021-06-14 PROCEDURE — 2500000003 HC RX 250 WO HCPCS: Performed by: NURSE ANESTHETIST, CERTIFIED REGISTERED

## 2021-06-14 PROCEDURE — 2709999900 HC NON-CHARGEABLE SUPPLY: Performed by: STUDENT IN AN ORGANIZED HEALTH CARE EDUCATION/TRAINING PROGRAM

## 2021-06-14 PROCEDURE — 6360000002 HC RX W HCPCS: Performed by: NURSE ANESTHETIST, CERTIFIED REGISTERED

## 2021-06-14 PROCEDURE — 3600000014 HC SURGERY LEVEL 4 ADDTL 15MIN: Performed by: STUDENT IN AN ORGANIZED HEALTH CARE EDUCATION/TRAINING PROGRAM

## 2021-06-14 PROCEDURE — 84703 CHORIONIC GONADOTROPIN ASSAY: CPT

## 2021-06-14 PROCEDURE — 30520 REPAIR OF NASAL SEPTUM: CPT | Performed by: STUDENT IN AN ORGANIZED HEALTH CARE EDUCATION/TRAINING PROGRAM

## 2021-06-14 PROCEDURE — 2500000003 HC RX 250 WO HCPCS: Performed by: STUDENT IN AN ORGANIZED HEALTH CARE EDUCATION/TRAINING PROGRAM

## 2021-06-14 PROCEDURE — 7100000000 HC PACU RECOVERY - FIRST 15 MIN: Performed by: STUDENT IN AN ORGANIZED HEALTH CARE EDUCATION/TRAINING PROGRAM

## 2021-06-14 PROCEDURE — 6360000002 HC RX W HCPCS: Performed by: ANESTHESIOLOGY

## 2021-06-14 PROCEDURE — 7100000010 HC PHASE II RECOVERY - FIRST 15 MIN: Performed by: STUDENT IN AN ORGANIZED HEALTH CARE EDUCATION/TRAINING PROGRAM

## 2021-06-14 PROCEDURE — 7100000011 HC PHASE II RECOVERY - ADDTL 15 MIN: Performed by: STUDENT IN AN ORGANIZED HEALTH CARE EDUCATION/TRAINING PROGRAM

## 2021-06-14 PROCEDURE — 3700000001 HC ADD 15 MINUTES (ANESTHESIA): Performed by: STUDENT IN AN ORGANIZED HEALTH CARE EDUCATION/TRAINING PROGRAM

## 2021-06-14 PROCEDURE — 3700000000 HC ANESTHESIA ATTENDED CARE: Performed by: STUDENT IN AN ORGANIZED HEALTH CARE EDUCATION/TRAINING PROGRAM

## 2021-06-14 PROCEDURE — 2580000003 HC RX 258: Performed by: STUDENT IN AN ORGANIZED HEALTH CARE EDUCATION/TRAINING PROGRAM

## 2021-06-14 RX ORDER — SODIUM CHLORIDE 0.9 % (FLUSH) 0.9 %
10 SYRINGE (ML) INJECTION PRN
Status: DISCONTINUED | OUTPATIENT
Start: 2021-06-14 | End: 2021-06-14 | Stop reason: HOSPADM

## 2021-06-14 RX ORDER — BACITRACIN ZINC AND POLYMYXIN B SULFATE 500; 1000 [USP'U]/G; [USP'U]/G
OINTMENT TOPICAL
Qty: 1 TUBE | Refills: 1 | Status: SHIPPED | OUTPATIENT
Start: 2021-06-14

## 2021-06-14 RX ORDER — ONDANSETRON 2 MG/ML
4 INJECTION INTRAMUSCULAR; INTRAVENOUS
Status: DISCONTINUED | OUTPATIENT
Start: 2021-06-14 | End: 2021-06-14 | Stop reason: HOSPADM

## 2021-06-14 RX ORDER — MAGNESIUM HYDROXIDE 1200 MG/15ML
LIQUID ORAL CONTINUOUS PRN
Status: DISCONTINUED | OUTPATIENT
Start: 2021-06-14 | End: 2021-06-14 | Stop reason: ALTCHOICE

## 2021-06-14 RX ORDER — SODIUM CHLORIDE, SODIUM LACTATE, POTASSIUM CHLORIDE, CALCIUM CHLORIDE 600; 310; 30; 20 MG/100ML; MG/100ML; MG/100ML; MG/100ML
INJECTION, SOLUTION INTRAVENOUS CONTINUOUS PRN
Status: DISCONTINUED | OUTPATIENT
Start: 2021-06-14 | End: 2021-06-14 | Stop reason: SDUPTHER

## 2021-06-14 RX ORDER — PROPOFOL 10 MG/ML
INJECTION, EMULSION INTRAVENOUS PRN
Status: DISCONTINUED | OUTPATIENT
Start: 2021-06-14 | End: 2021-06-14 | Stop reason: SDUPTHER

## 2021-06-14 RX ORDER — SODIUM CHLORIDE 9 MG/ML
25 INJECTION, SOLUTION INTRAVENOUS PRN
Status: DISCONTINUED | OUTPATIENT
Start: 2021-06-14 | End: 2021-06-14 | Stop reason: HOSPADM

## 2021-06-14 RX ORDER — SUCCINYLCHOLINE/SOD CL,ISO/PF 200MG/10ML
SYRINGE (ML) INTRAVENOUS PRN
Status: DISCONTINUED | OUTPATIENT
Start: 2021-06-14 | End: 2021-06-14 | Stop reason: SDUPTHER

## 2021-06-14 RX ORDER — ONDANSETRON 2 MG/ML
INJECTION INTRAMUSCULAR; INTRAVENOUS PRN
Status: DISCONTINUED | OUTPATIENT
Start: 2021-06-14 | End: 2021-06-14 | Stop reason: SDUPTHER

## 2021-06-14 RX ORDER — HYDRALAZINE HYDROCHLORIDE 20 MG/ML
5 INJECTION INTRAMUSCULAR; INTRAVENOUS EVERY 10 MIN PRN
Status: DISCONTINUED | OUTPATIENT
Start: 2021-06-14 | End: 2021-06-14 | Stop reason: HOSPADM

## 2021-06-14 RX ORDER — APREPITANT 40 MG/1
40 CAPSULE ORAL ONCE
Status: COMPLETED | OUTPATIENT
Start: 2021-06-14 | End: 2021-06-14

## 2021-06-14 RX ORDER — KETAMINE HCL IN NACL, ISO-OSM 100MG/10ML
SYRINGE (ML) INJECTION PRN
Status: DISCONTINUED | OUTPATIENT
Start: 2021-06-14 | End: 2021-06-14 | Stop reason: SDUPTHER

## 2021-06-14 RX ORDER — OXYMETAZOLINE HYDROCHLORIDE 0.05 G/100ML
SPRAY NASAL
Status: COMPLETED | OUTPATIENT
Start: 2021-06-14 | End: 2021-06-14

## 2021-06-14 RX ORDER — MIDAZOLAM HYDROCHLORIDE 1 MG/ML
INJECTION INTRAMUSCULAR; INTRAVENOUS PRN
Status: DISCONTINUED | OUTPATIENT
Start: 2021-06-14 | End: 2021-06-14 | Stop reason: SDUPTHER

## 2021-06-14 RX ORDER — LIDOCAINE HYDROCHLORIDE AND EPINEPHRINE 10; 10 MG/ML; UG/ML
INJECTION, SOLUTION INFILTRATION; PERINEURAL
Status: COMPLETED | OUTPATIENT
Start: 2021-06-14 | End: 2021-06-14

## 2021-06-14 RX ORDER — ONDANSETRON 4 MG/1
4 TABLET, ORALLY DISINTEGRATING ORAL EVERY 4 HOURS PRN
Qty: 5 TABLET | Refills: 1 | Status: SHIPPED | OUTPATIENT
Start: 2021-06-14 | End: 2021-06-21

## 2021-06-14 RX ORDER — HYDROMORPHONE HCL 110MG/55ML
PATIENT CONTROLLED ANALGESIA SYRINGE INTRAVENOUS PRN
Status: DISCONTINUED | OUTPATIENT
Start: 2021-06-14 | End: 2021-06-14 | Stop reason: SDUPTHER

## 2021-06-14 RX ORDER — ROCURONIUM BROMIDE 10 MG/ML
INJECTION, SOLUTION INTRAVENOUS PRN
Status: DISCONTINUED | OUTPATIENT
Start: 2021-06-14 | End: 2021-06-14 | Stop reason: SDUPTHER

## 2021-06-14 RX ORDER — SODIUM CHLORIDE 0.9 % (FLUSH) 0.9 %
10 SYRINGE (ML) INJECTION EVERY 12 HOURS SCHEDULED
Status: DISCONTINUED | OUTPATIENT
Start: 2021-06-14 | End: 2021-06-14 | Stop reason: HOSPADM

## 2021-06-14 RX ORDER — SODIUM CHLORIDE, SODIUM LACTATE, POTASSIUM CHLORIDE, CALCIUM CHLORIDE 600; 310; 30; 20 MG/100ML; MG/100ML; MG/100ML; MG/100ML
INJECTION, SOLUTION INTRAVENOUS CONTINUOUS
Status: DISCONTINUED | OUTPATIENT
Start: 2021-06-14 | End: 2021-06-14 | Stop reason: HOSPADM

## 2021-06-14 RX ORDER — HYDROMORPHONE HCL 110MG/55ML
0.5 PATIENT CONTROLLED ANALGESIA SYRINGE INTRAVENOUS EVERY 5 MIN PRN
Status: DISCONTINUED | OUTPATIENT
Start: 2021-06-14 | End: 2021-06-14 | Stop reason: HOSPADM

## 2021-06-14 RX ORDER — FENTANYL CITRATE 50 UG/ML
INJECTION, SOLUTION INTRAMUSCULAR; INTRAVENOUS PRN
Status: DISCONTINUED | OUTPATIENT
Start: 2021-06-14 | End: 2021-06-14 | Stop reason: SDUPTHER

## 2021-06-14 RX ORDER — OXYCODONE HYDROCHLORIDE AND ACETAMINOPHEN 5; 325 MG/1; MG/1
1 TABLET ORAL
Status: COMPLETED | OUTPATIENT
Start: 2021-06-14 | End: 2021-06-14

## 2021-06-14 RX ORDER — DEXAMETHASONE SODIUM PHOSPHATE 4 MG/ML
INJECTION, SOLUTION INTRA-ARTICULAR; INTRALESIONAL; INTRAMUSCULAR; INTRAVENOUS; SOFT TISSUE PRN
Status: DISCONTINUED | OUTPATIENT
Start: 2021-06-14 | End: 2021-06-14 | Stop reason: SDUPTHER

## 2021-06-14 RX ORDER — MEPERIDINE HYDROCHLORIDE 25 MG/ML
12.5 INJECTION INTRAMUSCULAR; INTRAVENOUS; SUBCUTANEOUS EVERY 5 MIN PRN
Status: DISCONTINUED | OUTPATIENT
Start: 2021-06-14 | End: 2021-06-14 | Stop reason: HOSPADM

## 2021-06-14 RX ORDER — BACITRACIN ZINC AND POLYMYXIN B SULFATE 500; 1000 [USP'U]/G; [USP'U]/G
OINTMENT TOPICAL
Status: COMPLETED | OUTPATIENT
Start: 2021-06-14 | End: 2021-06-14

## 2021-06-14 RX ORDER — LABETALOL HYDROCHLORIDE 5 MG/ML
5 INJECTION, SOLUTION INTRAVENOUS EVERY 10 MIN PRN
Status: DISCONTINUED | OUTPATIENT
Start: 2021-06-14 | End: 2021-06-14 | Stop reason: HOSPADM

## 2021-06-14 RX ORDER — DOXYCYCLINE HYCLATE 100 MG
100 TABLET ORAL 2 TIMES DAILY
Qty: 28 TABLET | Refills: 0 | Status: SHIPPED | OUTPATIENT
Start: 2021-06-14 | End: 2021-12-16 | Stop reason: SDUPTHER

## 2021-06-14 RX ORDER — OXYCODONE HYDROCHLORIDE 5 MG/1
5 TABLET ORAL EVERY 6 HOURS PRN
Qty: 20 TABLET | Refills: 0 | Status: SHIPPED | OUTPATIENT
Start: 2021-06-14 | End: 2021-06-19

## 2021-06-14 RX ORDER — CLINDAMYCIN PHOSPHATE 900 MG/50ML
900 INJECTION INTRAVENOUS EVERY 8 HOURS
Status: DISCONTINUED | OUTPATIENT
Start: 2021-06-14 | End: 2021-06-14 | Stop reason: HOSPADM

## 2021-06-14 RX ORDER — LIDOCAINE HYDROCHLORIDE 10 MG/ML
1 INJECTION, SOLUTION EPIDURAL; INFILTRATION; INTRACAUDAL; PERINEURAL
Status: DISCONTINUED | OUTPATIENT
Start: 2021-06-14 | End: 2021-06-14 | Stop reason: HOSPADM

## 2021-06-14 RX ORDER — LIDOCAINE HYDROCHLORIDE 20 MG/ML
INJECTION, SOLUTION EPIDURAL; INFILTRATION; INTRACAUDAL; PERINEURAL PRN
Status: DISCONTINUED | OUTPATIENT
Start: 2021-06-14 | End: 2021-06-14 | Stop reason: SDUPTHER

## 2021-06-14 RX ADMIN — PROPOFOL 200 MG: 10 INJECTION, EMULSION INTRAVENOUS at 12:22

## 2021-06-14 RX ADMIN — ROCURONIUM BROMIDE 40 MG: 10 INJECTION, SOLUTION INTRAVENOUS at 12:33

## 2021-06-14 RX ADMIN — OXYCODONE HYDROCHLORIDE AND ACETAMINOPHEN 1 TABLET: 5; 325 TABLET ORAL at 17:51

## 2021-06-14 RX ADMIN — HYDROMORPHONE HYDROCHLORIDE 0.5 MG: 2 INJECTION, SOLUTION INTRAMUSCULAR; INTRAVENOUS; SUBCUTANEOUS at 14:08

## 2021-06-14 RX ADMIN — Medication 140 MG: at 12:22

## 2021-06-14 RX ADMIN — APREPITANT 40 MG: 40 CAPSULE ORAL at 10:34

## 2021-06-14 RX ADMIN — HYDROMORPHONE HYDROCHLORIDE 0.5 MG: 2 INJECTION, SOLUTION INTRAMUSCULAR; INTRAVENOUS; SUBCUTANEOUS at 16:10

## 2021-06-14 RX ADMIN — SUGAMMADEX 200 MG: 100 INJECTION, SOLUTION INTRAVENOUS at 16:12

## 2021-06-14 RX ADMIN — MIDAZOLAM 2 MG: 1 INJECTION INTRAMUSCULAR; INTRAVENOUS at 12:19

## 2021-06-14 RX ADMIN — HYDROMORPHONE HYDROCHLORIDE 0.5 MG: 2 INJECTION, SOLUTION INTRAMUSCULAR; INTRAVENOUS; SUBCUTANEOUS at 15:35

## 2021-06-14 RX ADMIN — LIDOCAINE HYDROCHLORIDE 100 MG: 20 INJECTION, SOLUTION EPIDURAL; INFILTRATION; INTRACAUDAL; PERINEURAL at 12:22

## 2021-06-14 RX ADMIN — DEXAMETHASONE SODIUM PHOSPHATE 10 MG: 4 INJECTION, SOLUTION INTRAMUSCULAR; INTRAVENOUS at 12:29

## 2021-06-14 RX ADMIN — Medication 10 MG: at 15:27

## 2021-06-14 RX ADMIN — Medication 10 MG: at 12:40

## 2021-06-14 RX ADMIN — FENTANYL CITRATE 50 MCG: 50 INJECTION, SOLUTION INTRAMUSCULAR; INTRAVENOUS at 12:21

## 2021-06-14 RX ADMIN — SODIUM CHLORIDE 25 ML: 9 INJECTION, SOLUTION INTRAVENOUS at 10:33

## 2021-06-14 RX ADMIN — CLINDAMYCIN PHOSPHATE 900 MG: 900 INJECTION, SOLUTION INTRAVENOUS at 12:07

## 2021-06-14 RX ADMIN — ONDANSETRON 4 MG: 2 INJECTION INTRAMUSCULAR; INTRAVENOUS at 12:29

## 2021-06-14 RX ADMIN — SODIUM CHLORIDE, POTASSIUM CHLORIDE, SODIUM LACTATE AND CALCIUM CHLORIDE: 600; 310; 30; 20 INJECTION, SOLUTION INTRAVENOUS at 13:30

## 2021-06-14 RX ADMIN — ROCURONIUM BROMIDE 10 MG: 10 INJECTION, SOLUTION INTRAVENOUS at 13:21

## 2021-06-14 ASSESSMENT — PULMONARY FUNCTION TESTS
PIF_VALUE: 20
PIF_VALUE: 18
PIF_VALUE: 20
PIF_VALUE: 18
PIF_VALUE: 20
PIF_VALUE: 20
PIF_VALUE: 17
PIF_VALUE: 18
PIF_VALUE: 18
PIF_VALUE: 20
PIF_VALUE: 19
PIF_VALUE: 19
PIF_VALUE: 1
PIF_VALUE: 17
PIF_VALUE: 20
PIF_VALUE: 20
PIF_VALUE: 18
PIF_VALUE: 20
PIF_VALUE: 18
PIF_VALUE: 3
PIF_VALUE: 18
PIF_VALUE: 18
PIF_VALUE: 19
PIF_VALUE: 18
PIF_VALUE: 19
PIF_VALUE: 18
PIF_VALUE: 18
PIF_VALUE: 20
PIF_VALUE: 18
PIF_VALUE: 16
PIF_VALUE: 0
PIF_VALUE: 18
PIF_VALUE: 1
PIF_VALUE: 20
PIF_VALUE: 18
PIF_VALUE: 4
PIF_VALUE: 18
PIF_VALUE: 18
PIF_VALUE: 20
PIF_VALUE: 3
PIF_VALUE: 3
PIF_VALUE: 17
PIF_VALUE: 20
PIF_VALUE: 18
PIF_VALUE: 18
PIF_VALUE: 1
PIF_VALUE: 20
PIF_VALUE: 20
PIF_VALUE: 4
PIF_VALUE: 2
PIF_VALUE: 20
PIF_VALUE: 18
PIF_VALUE: 20
PIF_VALUE: 18
PIF_VALUE: 18
PIF_VALUE: 19
PIF_VALUE: 3
PIF_VALUE: 20
PIF_VALUE: 17
PIF_VALUE: 18
PIF_VALUE: 17
PIF_VALUE: 19
PIF_VALUE: 20
PIF_VALUE: 20
PIF_VALUE: 18
PIF_VALUE: 17
PIF_VALUE: 19
PIF_VALUE: 4
PIF_VALUE: 3
PIF_VALUE: 20
PIF_VALUE: 0
PIF_VALUE: 18
PIF_VALUE: 20
PIF_VALUE: 19
PIF_VALUE: 19
PIF_VALUE: 20
PIF_VALUE: 18
PIF_VALUE: 3
PIF_VALUE: 17
PIF_VALUE: 3
PIF_VALUE: 18
PIF_VALUE: 19
PIF_VALUE: 4
PIF_VALUE: 20
PIF_VALUE: 20
PIF_VALUE: 18
PIF_VALUE: 20
PIF_VALUE: 19
PIF_VALUE: 20
PIF_VALUE: 20
PIF_VALUE: 18
PIF_VALUE: 18
PIF_VALUE: 20
PIF_VALUE: 19
PIF_VALUE: 18
PIF_VALUE: 17
PIF_VALUE: 2
PIF_VALUE: 17
PIF_VALUE: 20
PIF_VALUE: 2
PIF_VALUE: 19
PIF_VALUE: 20
PIF_VALUE: 20
PIF_VALUE: 0
PIF_VALUE: 20
PIF_VALUE: 17
PIF_VALUE: 20
PIF_VALUE: 3
PIF_VALUE: 20
PIF_VALUE: 4
PIF_VALUE: 20
PIF_VALUE: 18
PIF_VALUE: 20
PIF_VALUE: 4
PIF_VALUE: 3
PIF_VALUE: 18
PIF_VALUE: 18
PIF_VALUE: 20
PIF_VALUE: 3
PIF_VALUE: 18
PIF_VALUE: 3
PIF_VALUE: 17
PIF_VALUE: 3
PIF_VALUE: 4
PIF_VALUE: 18
PIF_VALUE: 20
PIF_VALUE: 18
PIF_VALUE: 20
PIF_VALUE: 3
PIF_VALUE: 20
PIF_VALUE: 18
PIF_VALUE: 3
PIF_VALUE: 18
PIF_VALUE: 19
PIF_VALUE: 18
PIF_VALUE: 20
PIF_VALUE: 20
PIF_VALUE: 19
PIF_VALUE: 20
PIF_VALUE: 18
PIF_VALUE: 20
PIF_VALUE: 18
PIF_VALUE: 18
PIF_VALUE: 19
PIF_VALUE: 13
PIF_VALUE: 18
PIF_VALUE: 18
PIF_VALUE: 20
PIF_VALUE: 18
PIF_VALUE: 20
PIF_VALUE: 3
PIF_VALUE: 20
PIF_VALUE: 1
PIF_VALUE: 18
PIF_VALUE: 3
PIF_VALUE: 18
PIF_VALUE: 20
PIF_VALUE: 18
PIF_VALUE: 1
PIF_VALUE: 20
PIF_VALUE: 18
PIF_VALUE: 18
PIF_VALUE: 20
PIF_VALUE: 17
PIF_VALUE: 17
PIF_VALUE: 20
PIF_VALUE: 21
PIF_VALUE: 20
PIF_VALUE: 18
PIF_VALUE: 18
PIF_VALUE: 20
PIF_VALUE: 18
PIF_VALUE: 24
PIF_VALUE: 3
PIF_VALUE: 18
PIF_VALUE: 20
PIF_VALUE: 18
PIF_VALUE: 20
PIF_VALUE: 18
PIF_VALUE: 20
PIF_VALUE: 20
PIF_VALUE: 18
PIF_VALUE: 17
PIF_VALUE: 5
PIF_VALUE: 18
PIF_VALUE: 18
PIF_VALUE: 4
PIF_VALUE: 20
PIF_VALUE: 18
PIF_VALUE: 20
PIF_VALUE: 20
PIF_VALUE: 18
PIF_VALUE: 20
PIF_VALUE: 18
PIF_VALUE: 20
PIF_VALUE: 18
PIF_VALUE: 20
PIF_VALUE: 19
PIF_VALUE: 18
PIF_VALUE: 3
PIF_VALUE: 20
PIF_VALUE: 18
PIF_VALUE: 18
PIF_VALUE: 17
PIF_VALUE: 18
PIF_VALUE: 18
PIF_VALUE: 4
PIF_VALUE: 18
PIF_VALUE: 20
PIF_VALUE: 18
PIF_VALUE: 20
PIF_VALUE: 18
PIF_VALUE: 20
PIF_VALUE: 18
PIF_VALUE: 3
PIF_VALUE: 18

## 2021-06-14 ASSESSMENT — PAIN DESCRIPTION - LOCATION: LOCATION: NOSE

## 2021-06-14 ASSESSMENT — PAIN DESCRIPTION - DESCRIPTORS: DESCRIPTORS: DISCOMFORT

## 2021-06-14 ASSESSMENT — PAIN DESCRIPTION - ONSET: ONSET: ON-GOING

## 2021-06-14 ASSESSMENT — LIFESTYLE VARIABLES: SMOKING_STATUS: 0

## 2021-06-14 ASSESSMENT — PAIN SCALES - GENERAL
PAINLEVEL_OUTOF10: 4
PAINLEVEL_OUTOF10: 3

## 2021-06-14 ASSESSMENT — PAIN DESCRIPTION - FREQUENCY: FREQUENCY: CONTINUOUS

## 2021-06-14 ASSESSMENT — PAIN DESCRIPTION - PAIN TYPE: TYPE: SURGICAL PAIN

## 2021-06-14 ASSESSMENT — PAIN - FUNCTIONAL ASSESSMENT: PAIN_FUNCTIONAL_ASSESSMENT: ACTIVITIES ARE NOT PREVENTED

## 2021-06-14 NOTE — H&P
[address] [facility]ECK SURGERY  H/P      Emir Jansen (:  1985) is a 37 y.o. female, here for evaluation of the following chief complaint(s):  No chief complaint on file. ASSESSMENT/PLAN:  1. Nasal obstruction  2. Chronic rhinitis  3. Sensation of fullness in both ears  4. Chronic ethmoidal sinusitis  5. Nasal valve collapse      This is a very pleasant 37 y.o. female here today for evaluation of the the above-noted complaints. I independently reviewed her post treatment CT sinus scan with her today and it shows evidence of bilateral ethmoid sinus inflammation as well as a right-sided septal deviation and inferior turbinate hypertrophy. The ostiomeatal complexes are also obstructed bilaterally right greater than left. Additionally, with a modified caudal maneuver she experienced improvement of her nasal breathing bilaterally left greater than right. She has trialed appropriate medical therapy including antibiotics, nasal steroid sprays and sinonasal irrigations. Despite this she continues to remain very symptomatic. I discussed with the patient different treatment options including continued medical therapy with nasal steroid sprays and irrigations versus surgery to improve her nasal airway. She is very interested in proceeding with surgery and I think this is reasonable. We discussed that based on her radiographic and clinical exam findings I feel that she would benefit from endoscopic sinus surgery as well as an open septorhinoplasty to address her nasal valve collapse. -Given the above, will plan for Bilateral functional endoscopic sinus surgery with possible maxillary antrostomy, anterior/posterior ethmoidectomies, frontal sinusotomy and sphenoidotomy with removal of tissue.   We will also plan to do an open septorhinoplasty.  -Risks of sinus surgery include anosmia/hyposmia, dysgeusia, hemorrhage, pain, infection/inflammation, poor cosmetic outcome or sinonasal complaints. She has not had no change since I saw her last.    REVIEW OF SYSTEMS  The following systems were reviewed and revealed the following in addition to any already discussed in the HPI:    PHYSICAL EXAM    GENERAL: No acute distress, alert and oriented, no hoarseness, strong voice  EYES: EOMI, Anti-icteric  HENT:   Head: Normocephalic and atraumatic. Face:  Symmetric, facial nerve intact, no sinus tenderness  Right Ear: Normal external ear, normal external auditory canal, intact tympanic membrane with normal mobility and aerated middle ear  Left Ear: Normal external ear, normal external auditory canal, intact tympanic membrane with normal mobility and aerated middle ear  Mouth/Oral Cavity:  normal lips, Uvula is midline, no mucosal lesions, no trismus, normal dentition, normal salivary quality/flow  Oropharynx/Larynx:  normal oropharynx, 2+ tonsils; patient did not tolerate mirror exam due to excessive gag reflex  Nose:Normal external nasal appearance. Anterior rhinoscopy shows  a deviated septum preventing view posteriorly. Mild hypertrophy turbinates. Normal mucosa   NECK: Normal range of motion, no thyromegaly, trachea is midline, no lymphadenopathy, no neck masses, no crepitus  CHEST: Normal respiratory effort, no retractions, breathing comfortably  SKIN: No rashes, normal appearing skin, no evidence of skin lesions/tumors  Neuro:  cranial nerve II-XII intact; normal gait  Cardio:  no edema        PROCEDURE  Nasal Endoscopy (CPT code 76809) from initial visit    Preop: chronic rhinitis  Postop: Same    Verbal consent was received. After topical anesthesia and decongestion had been obtained using aerosolized 1% lidocaine and oxymetazoline, a 45 degree rigid endoscope was placed into both nares with the patient in a sitting position.  The following was observed:    Right Nasal Cavity and Paranasal Sinuses:  Polyp score = 0 (0 = no polyps, 1 = small polyps in middle meatus not reaching below this note.     --Lali Villalba MD

## 2021-06-14 NOTE — ANESTHESIA PRE PROCEDURE
Department of Anesthesiology  Preprocedure Note       Name:  Uriah Berry   Age:  37 y.o.  :  1985                                          MRN:  [id number]         Date:  2021      Surgeon: Ricardo Traylor):  Cristhian Yarbrough MD    Procedure: Procedure(s):  BILATERAL ENDOSCOPIC SINUS SURGERY WITH IMAGE GUIDANCE (94597, 16199, 09415)  REPAIR NASAL VALVE COLLAPSE; SUBMUCOUS RESECTION INFERIOR TURBINATE; SEPTOPLASTY (90367, [id number], 33758,63786)    Medications prior to admission:   Prior to Admission medications    Medication Sig Start Date End Date Taking? Authorizing Provider   labetalol (NORMODYNE) 300 MG tablet Take 1 tablet by mouth 2 times daily 21  Yes Levy Dugan MD   fluticasone Ivin Larch) 50 MCG/ACT nasal spray 2 sprays by Nasal route 2 times daily 3/16/21   Cristhian Yarbrough MD       Current medications:    No current facility-administered medications for this encounter. Allergies:     Allergies   Allergen Reactions    Clarithromycin Itching and Shortness Of Breath    Penicillins Anaphylaxis       Problem List:    Patient Active Problem List   Diagnosis Code    Allergic rhinitis J30.9    Essential hypertension I10    Hyperlipidemia E78.5       Past Medical History:        Diagnosis Date    Allergic rhinitis     Essential hypertension     Hyperlipidemia     Shoulder impingement syndrome     Left       Past Surgical History:        Procedure Laterality Date     SECTION  2020    SKIN BIOPSY         Social History:    Social History     Tobacco Use    Smoking status: Never Smoker    Smokeless tobacco: Never Used   Substance Use Topics    Alcohol use: Yes     Comment: social                                Counseling given: Not Answered      Vital Signs (Current):   Vitals:    06/10/21 1031 21 1020 21 1025   BP:   (!) 151/81   Pulse:   74   Resp:   16   Temp:   97.8 °F (36.6 °C)   TempSrc:   Temporal   SpO2:   98%   Weight: 250 lb (113.4 kg) 244 lb (110.7 kg)    Height:

## 2021-06-14 NOTE — PROGRESS NOTES
brought to bedside and d/c instructions discussed, v/u with no questions at this time. Ice pack placed on pts upper nose and eyes. Pt c/o feeling hot, fan placed on pt. Pt awake and alert at this time. Pt on RA, and VSS. Pt denies pain and nausea, tolerating PO. Pt meets criteria to be discharged from Phase 1.

## 2021-06-14 NOTE — PROGRESS NOTES
Pt arrived from OR to PACU bay 1. Reported received from [address] staff. Pt does not arouse to voice. Surgical incisions dressings in place to nose. Pt on 6L simple mask, OA, NSR, and VSS. Will continue to monitor.

## 2021-06-14 NOTE — ANESTHESIA POSTPROCEDURE EVALUATION
Department of Anesthesiology  Postprocedure Note    Patient: Jamison Randle  MRN: [id number]  YOB: 1985  Date of evaluation: 6/14/2021  Time:  4:49 PM     Procedure Summary     Date: 06/14/21 Room / Location: [address]    Anesthesia Start: 4955 Anesthesia Stop: 2844    Procedure: [address]; SUBMUCOUS RESECTION INFERIOR TURBINATE; SEPTOPLASTY (35394, [id number], 28029,98385) (N/A Nose) Diagnosis: (J32.2 CHRONIC ETHMOIDAL SINUSITIS; J34.89 NASAL VALVE COLLAPSE; J34.2 SEPTAL DEVIATION; J34.3 INFERIOR TURBINATE HYPERTROPHY)    Surgeons: Amandeep Bui MD Responsible Provider: Carson Edge MD    Anesthesia Type: general ASA Status: 2          Anesthesia Type: general    Jolly Phase I: Jolly Score: 5    Jolly Phase II:      Last vitals: Reviewed and per EMR flowsheets.        Anesthesia Post Evaluation    Patient location during evaluation: PACU  Patient participation: complete - patient participated  Level of consciousness: awake and alert  Pain score: 0  Airway patency: patent  Nausea & Vomiting: no nausea and no vomiting  Complications: no  Cardiovascular status: blood pressure returned to baseline  Respiratory status: acceptable  Hydration status: euvolemic

## 2021-06-14 NOTE — PROGRESS NOTES
Discharge instructions review with patient and . All home medications have been reviewed, pt v/u. Discharge instructions signed. Pt discharged via wheelchair. Pt discharged with all belongings. 4 meds escriped to pharmacy.  taking stable pt home.

## 2021-06-15 NOTE — OP NOTE
Operative Note      Patient: Richelle Thayer  YOB: 1985  MRN: [id number]    Date of Procedure: 6/14/2021    Pre-Op Diagnosis: J32.2 CHRONIC ETHMOIDAL SINUSITIS; J34.89 NASAL VALVE COLLAPSE; J34.2 SEPTAL DEVIATION; J34.3 INFERIOR TURBINATE HYPERTROPHY    Post-Op Diagnosis: Same       Procedures:  1. Repair of bilateral nasal valve collapse  grafts  2. Septoplasty  3. Submucous reduction of the inferior turbinates    Surgeon(s):  Chyna Garsia MD    Assistant:   Surgical Assistant: Alejandra Rain    Anesthesia: General    Estimated Blood Loss (mL): less than 50     Complications: None    Specimens:   * No specimens in log *    Implants:  * No implants in log *      Drains: * No LDAs found *    Findings:    1. Leftward anterior and dorsal septal deviation; most posterior bony deviation. 2.  Bilateral narrow nasal valve corrected with  grafts   3.  Bilateral inferior turbinates well reduced with submucous resection     Indications: This is a 37 y.o. female with chronic nasal congestion secondary to nasal valve collapse, deviated septum and large turbinates. She is here for rhinoplasty and turbinate reduction. Risks and benefits discussed with the patient including alternate treatment options, Informed consent was obtained, the patient elected to proceed with the planned procedure.     DETAILS OF PROCEDURE(S):   The patient was brought to the operating room placed in supine position operating table. She underwent uncomplicated general anesthesia with endotracheal intubation. Exam showed an anterior leftward septal deviation with more posterior leftward bony deviation. She had very narrow internal nasal valves. Large turbinates. 10 mL 1% lidocaine was injected into the turbinates, septum and nasal cavity. She was prepped and draped in sterile fashion.     The nasal vibrissae were trimmed. I first began with the septoplasty portion of the procedure.  Hemitransfixion incision was made. Then raised bilateral left and right mucoperichondrial flaps. These were elevated and anterior to posterior direction. There is no perforation or significant tear. I left a 1.5 cm L strut to preserve the nasal dorsum and support. Deviated bone and cartilage were removed in an anterior to posterior fashion. There was a large bony deviation posteriorly. I was able to remove a nice sized piece of nondeviated cartilage to be used for the  grafts later. I then inserted nasal endoscope into the bilateral nasal cavities. There is significant improvement in her nasal septal deviation the hemitransfixion incision was then closed with interrupted 4-0 chromic sutures    I then designed an inverted V shaped columellar incision. Bilateral marginal incisions were made. Soft tissue was reflected off of the lower lateral upper lateral cartilages. The interdomal ligament was preserved. The anterior aspect of the nasal septum was identified.      I then disarticulated the upper lateral cartilages from their attachment points to the dorsal nasal septum. I then developed small submucoperichondrial pockets from the root of the nasal bones down to the inferior aspects of the upper lateral cartilages. Next bilateral  grafts were fashioned from the removed quadrangular cartilage. These were placed between the septum and disarticulated upper lateral cartilages. The  grafts were sutured to the septum first in order to correct the leftward deviated anterior septum. The upper lateral cartilages were then attached to the  grafts and septum with horizontal mattress 5-0 PDS sutures.      Next 1 mL of 1% lidocaine with epinephrine was injected into the anterior aspect of each inferior turbinate. A submucosal pocket was formed. A submucosal reduction was performed with a turbinate wand on the microdebrider. The bilateral turbinates were then outfractured.  I then reexamined the nasal cavities

## 2021-06-21 ENCOUNTER — OFFICE VISIT (OUTPATIENT)
Dept: ENT CLINIC | Age: 36
End: 2021-06-21

## 2021-06-21 VITALS
HEART RATE: 74 BPM | WEIGHT: 244 LBS | DIASTOLIC BLOOD PRESSURE: 76 MMHG | BODY MASS INDEX: 37.1 KG/M2 | OXYGEN SATURATION: 96 % | TEMPERATURE: 97.9 F | SYSTOLIC BLOOD PRESSURE: 122 MMHG

## 2021-06-21 DIAGNOSIS — J31.0 CHRONIC RHINITIS: ICD-10-CM

## 2021-06-21 DIAGNOSIS — J32.2 CHRONIC ETHMOIDAL SINUSITIS: ICD-10-CM

## 2021-06-21 DIAGNOSIS — J32.0 CHRONIC MAXILLARY SINUSITIS: ICD-10-CM

## 2021-06-21 DIAGNOSIS — H93.8X3 SENSATION OF FULLNESS IN BOTH EARS: ICD-10-CM

## 2021-06-21 DIAGNOSIS — J34.89 NASAL OBSTRUCTION: Primary | ICD-10-CM

## 2021-06-21 DIAGNOSIS — J34.89 NASAL VALVE COLLAPSE: ICD-10-CM

## 2021-06-21 DIAGNOSIS — J35.01 CHRONIC TONSILLITIS: ICD-10-CM

## 2021-06-21 PROCEDURE — 99024 POSTOP FOLLOW-UP VISIT: CPT | Performed by: STUDENT IN AN ORGANIZED HEALTH CARE EDUCATION/TRAINING PROGRAM

## 2021-06-21 NOTE — PROGRESS NOTES
Danny [address] (:  1985) is a 37 y.o. female, here for evaluation of the following chief complaint(s):  Post-Op Check (P/O SEPTOPLASTY 21. No complaints today)      ASSESSMENT/PLAN:  1. Nasal obstruction  2. Chronic rhinitis  3. Sensation of fullness in both ears  4. Chronic ethmoidal sinusitis  5. Nasal valve collapse  6. Chronic tonsillitis  7. Chronic maxillary sinusitis      This is a very pleasant 37 y.o. female here today for evaluation of the the above-noted complaints. She is now status post open septorhinoplasty with  grafts to address her nasal valve collapse as well as concomitant septoplasty and inferior turbinate reduction. Overall she is healing very well from her surgery and postoperative photos are documented in the EMR. I have asked her to continue to avoid applying pressure or hitting her nose and to use nasal saline irrigations 2-3 times per day. I have also asked her to finish her medications. She can return to full work duties in 1 week. I will see her back in 2 to 3 weeks. SUBJECTIVE/OBJECTIVE:  Mindy Bob is here today for evaluation of issues related to her nose. The patient states that she gets multiple sinus infections per year. These usually last for several weeks at a time. She was recently seen by her primary care physician and prescribed Omnicef but feels like she is still symptomatic. She has difficulty breathing through her nose. She gets intermittent nasal drainage which can alternate from clear to greenish-yellow. When she gets her sinus infections she also gets a sensation of fullness in her ears. She does not get nosebleeds. She has never had sinus imaging or nasal surgery. She has not use any medications in her nose. The patient also notes a history of chronic tonsil infections and tonsil stones. This is been going on for many years.   Previous primary care doctor told her that she was not a candidate for tonsillectomy based on her age. Update 3/16/2021:    Patient presents today for follow-up. She is still experiencing some difficulties breathing through her nose greater on the left than the right. She gets some consistent nasal drainage. Her symptoms are not improved since she had antibiotics. Update 5/3/2021:    Patient presents today for follow-up. She is still getting significant nasal obstruction. She is continue to use her nasal steroid sprays and irrigations. Update 5/24/2021:    Patient presents today for follow-up regarding her chronic sinonasal complaints. She has not had no change since I saw her last.    Update 6/21/2021:    Patient presents today for follow-up. She is still having some issues related to drainage from her nose. She is not had any trauma to her nose since the surgery. REVIEW OF SYSTEMS  The following systems were reviewed and revealed the following in addition to any already discussed in the HPI:    PHYSICAL EXAM    GENERAL: No acute distress, alert and oriented, no hoarseness, strong voice  EYES: EOMI, Anti-icteric  HENT:   Head: Normocephalic and atraumatic. Face:  Symmetric, facial nerve intact, no sinus tenderness  Right Ear: Normal external ear, normal external auditory canal, intact tympanic membrane with normal mobility and aerated middle ear  Left Ear: Normal external ear, normal external auditory canal, intact tympanic membrane with normal mobility and aerated middle ear  Mouth/Oral Cavity:  normal lips, Uvula is midline, no mucosal lesions, no trismus, normal dentition, normal salivary quality/flow  Oropharynx/Larynx:  normal oropharynx, 2+ tonsils; patient did not tolerate mirror exam due to excessive gag reflex  Nose:Normal external nasal appearance. Anterior rhinoscopy shows  a deviated septum preventing view posteriorly. Mild hypertrophy turbinates.   Normal mucosa   NECK: Normal range of motion, no thyromegaly, trachea is midline, no lymphadenopathy, no neck masses, no crepitus  CHEST: Normal respiratory effort, no retractions, breathing comfortably  SKIN: No rashes, normal appearing skin, no evidence of skin lesions/tumors  Neuro:  cranial nerve II-XII intact; normal gait  Cardio:  no edema    The [place] splint was removed. There was evidence of expected postoperative swelling of the nose. The nasal dorsum was straight and midline. The inverted V incision was healing well. Some secretions were suctioned from the nasal cavity. The Hernandez splints were removed. PROCEDURE  Nasal Endoscopy (CPT code 93947) from initial visit    Preop: chronic rhinitis  Postop: Same    Verbal consent was received. After topical anesthesia and decongestion had been obtained using aerosolized 1% lidocaine and oxymetazoline, a 45 degree rigid endoscope was placed into both nares with the patient in a sitting position.  The following was observed:    Right Nasal Cavity and Paranasal Sinuses:  Polyp score = 0 (0 = no polyps, 1 = small polyps in middle meatus not reaching below the inferior border of the middle radu, 2 = polyps reaching below the middle border of the middle turbinate, 3= large polyps reaching the lower border of the inferior turbinate or polyps medial to the middle radu, 4= large polyps causing almost complete congestion/obstruction of the interior meatus)  Edema score = 1 (0 = absent, 1 = mild, 2 = severe)  Discharge score = 2 (0 = no discharge, 1 = clear thin discharge, 2 = thick purulent discharge)    Left Nasal Cavity and Paranasal Sinuses:    Polyp score = 0 (0 = no polyps, 1 = small polyps in middle meatus not reaching below the inferior border of the middle radu, 2 = polyps reaching below the middle border of the middle turbinate, 3= large polyps reaching the lower border of the inferior turbinate or polyps medial to the middle radu, 4= large polyps causing almost complete congestion/obstruction of the interior meatus)  Edema score = 1 (0 = absent, 1 = mild, 2 = severe)  Discharge score = 1 (0 = no discharge, 1 = clear thin discharge, 2 = thick purulent discharge)    Septum: intact and deviated high septal deviation with swell body  Other:   -The inferior and middle turbinates were examined. The middle meatus, and sphenoethmoid recess was examined bilaterally.    -There is evidence of purulence in the right middle meatus. No evidence of polyps mild turbinate hypertrophy. Modified Tom maneuver improves nasal airway breathing left greater than right.  -There were no complications. Tolerated well without complication. I attest that I was present for and did the entire procedure myself. This note was generated completely or in part utilizing Dragon dictation speech recognition software. Occasionally, words are mistranscribed and despite editing, the text may contain inaccuracies due to incorrect word recognition. If further clarification is needed please contact the office at [phone]. An electronic signature was used to authenticate this note.     --Conner Desai MD

## 2021-07-06 ENCOUNTER — OFFICE VISIT (OUTPATIENT)
Dept: ENT CLINIC | Age: 36
End: 2021-07-06
Payer: OTHER GOVERNMENT

## 2021-07-06 VITALS — WEIGHT: 244 LBS | BODY MASS INDEX: 37.1 KG/M2

## 2021-07-06 DIAGNOSIS — J32.2 CHRONIC ETHMOIDAL SINUSITIS: ICD-10-CM

## 2021-07-06 DIAGNOSIS — J34.89 NASAL OBSTRUCTION: Primary | ICD-10-CM

## 2021-07-06 DIAGNOSIS — J32.0 CHRONIC MAXILLARY SINUSITIS: ICD-10-CM

## 2021-07-06 DIAGNOSIS — J34.89 NASAL VALVE COLLAPSE: ICD-10-CM

## 2021-07-06 DIAGNOSIS — H93.8X3 SENSATION OF FULLNESS IN BOTH EARS: ICD-10-CM

## 2021-07-06 DIAGNOSIS — J35.01 CHRONIC TONSILLITIS: ICD-10-CM

## 2021-07-06 DIAGNOSIS — J31.0 CHRONIC RHINITIS: ICD-10-CM

## 2021-07-06 PROCEDURE — 31231 NASAL ENDOSCOPY DX: CPT | Performed by: STUDENT IN AN ORGANIZED HEALTH CARE EDUCATION/TRAINING PROGRAM

## 2021-07-06 PROCEDURE — 99024 POSTOP FOLLOW-UP VISIT: CPT | Performed by: STUDENT IN AN ORGANIZED HEALTH CARE EDUCATION/TRAINING PROGRAM
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
## 2021-07-09 ENCOUNTER — OFFICE VISIT (OUTPATIENT)
Dept: ENT CLINIC | Age: 36
End: 2021-07-09
Payer: OTHER GOVERNMENT

## 2021-07-09 VITALS — SYSTOLIC BLOOD PRESSURE: 140 MMHG | WEIGHT: 242 LBS | DIASTOLIC BLOOD PRESSURE: 86 MMHG | BODY MASS INDEX: 36.8 KG/M2

## 2021-07-09 DIAGNOSIS — R04.0 EPISTAXIS: Primary | ICD-10-CM

## 2021-07-09 PROCEDURE — 30901 CONTROL OF NOSEBLEED: CPT | Performed by: OTOLARYNGOLOGY

## 2021-08-03 ENCOUNTER — OFFICE VISIT (OUTPATIENT)
Dept: ENT CLINIC | Age: 36
End: 2021-08-03
Payer: OTHER GOVERNMENT

## 2021-08-03 VITALS — WEIGHT: 248 LBS | BODY MASS INDEX: 37.71 KG/M2

## 2021-08-03 DIAGNOSIS — J34.89 NASAL VALVE COLLAPSE: ICD-10-CM

## 2021-08-03 DIAGNOSIS — R04.0 EPISTAXIS: Primary | ICD-10-CM

## 2021-08-03 DIAGNOSIS — J34.89 NASAL OBSTRUCTION: ICD-10-CM

## 2021-08-03 DIAGNOSIS — J34.2 DEVIATED NASAL SEPTUM: ICD-10-CM

## 2021-08-03 DIAGNOSIS — J31.0 CHRONIC RHINITIS: ICD-10-CM

## 2021-08-03 PROCEDURE — 31231 NASAL ENDOSCOPY DX: CPT | Performed by: STUDENT IN AN ORGANIZED HEALTH CARE EDUCATION/TRAINING PROGRAM

## 2021-08-03 PROCEDURE — 99024 POSTOP FOLLOW-UP VISIT: CPT | Performed by: STUDENT IN AN ORGANIZED HEALTH CARE EDUCATION/TRAINING PROGRAM

## 2021-08-03 RX ORDER — FLUTICASONE PROPIONATE 50 MCG
2 SPRAY, SUSPENSION (ML) NASAL 2 TIMES DAILY
Qty: 2 BOTTLE | Refills: 3 | Status: SHIPPED | OUTPATIENT
Start: 2021-08-03 | End: 2021-11-19

## 2021-08-03 NOTE — PROGRESS NOTES
Danny [address] (:  1985) is a 37 y.o. female, here for evaluation of the following chief complaint(s):  Post-Op Check (had to go back to the hospital but no nose bleeds since then )      ASSESSMENT/PLAN:  1. Epistaxis  2. Nasal obstruction  3. Chronic rhinitis  4. Nasal valve collapse  5. Deviated nasal septum      This is a very pleasant 37 y.o. female here today for evaluation of the the above-noted complaints. She is now status post open septorhinoplasty with  grafts to address her nasal valve collapse as well as concomitant septoplasty and inferior turbinate reduction on 2021. Overall she is healing very well from her surgery and postoperative photos are documented in the EMR. Her nose is essentially back to its normal appearance and she reports significant improvement in her nasal airway breathing. I will plan to start her on fluticasone 2 sprays twice daily to be used to address her chronic nasal drainage. I have asked her to continue to utilize her sinonasal irrigations for now. The risks, benefits and alternatives to intranasal steroid use were discussed with the patient in detail, including the risks of burning, dryness, crusting, and occasional nosebleeds. Additional rare risks include septal perforations, mucosal atrophy, contact dermatitis as well as the potential risk of glaucoma or cataracts with sustained and prolonged use. The patient expressed understanding of the risks and wished to proceed with therapy. SUBJECTIVE/OBJECTIVE:  Mindy Sánchez is here today for evaluation of issues related to her nose. The patient states that she gets multiple sinus infections per year. These usually last for several weeks at a time. She was recently seen by her primary care physician and prescribed Omnicef but feels like she is still symptomatic.   She has difficulty breathing through her which was pretty heavy but is since resolved. This occurred from the left side. She is now getting significant nasal drainage which is greenish and yellow. REVIEW OF SYSTEMS  The following systems were reviewed and revealed the following in addition to any already discussed in the HPI:    PHYSICAL EXAM    GENERAL: No acute distress, alert and oriented, no hoarseness, strong voice  EYES: EOMI, Anti-icteric  HENT:   Head: Normocephalic and atraumatic. Face:  Symmetric, facial nerve intact, no sinus tenderness  Right Ear: Normal external ear, normal external auditory canal, intact tympanic membrane with normal mobility and aerated middle ear  Left Ear: Normal external ear, normal external auditory canal, intact tympanic membrane with normal mobility and aerated middle ear  Mouth/Oral Cavity:  normal lips, Uvula is midline, no mucosal lesions, no trismus, normal dentition, normal salivary quality/flow  Oropharynx/Larynx:  normal oropharynx, 2+ tonsils; patient did not tolerate mirror exam due to excessive gag reflex  Nose:Normal external nasal appearance. Anterior rhinoscopy shows  a deviated septum preventing view posteriorly. Mild hypertrophy turbinates. Normal mucosa   NECK: Normal range of motion, no thyromegaly, trachea is midline, no lymphadenopathy, no neck masses, no crepitus  CHEST: Normal respiratory effort, no retractions, breathing comfortably  SKIN: No rashes, normal appearing skin, no evidence of skin lesions/tumors  Neuro:  cranial nerve II-XII intact; normal gait  Cardio:  no edema    The [place] splint was removed. There was evidence of expected postoperative swelling of the nose. The nasal dorsum was straight and midline. The inverted V incision was healing well. Some secretions were suctioned from the nasal cavity. The Hernandez splints were removed. PROCEDURE  Nasal Endoscopy (CPT code 56263)    Preop: chronic rhinitis  Postop: Same    Verbal consent was received.  After topical anesthesia and decongestion had been obtained using aerosolized 1% lidocaine and oxymetazoline, a 45 degree rigid endoscope was placed into both nares with the patient in a sitting position. The following was observed:    Right Nasal Cavity and Paranasal Sinuses:  Polyp score = 0 (0 = no polyps, 1 = small polyps in middle meatus not reaching below the inferior border of the middle radu, 2 = polyps reaching below the middle border of the middle turbinate, 3= large polyps reaching the lower border of the inferior turbinate or polyps medial to the middle radu, 4= large polyps causing almost complete congestion/obstruction of the interior meatus)  Edema score = 1 (0 = absent, 1 = mild, 2 = severe)  Discharge score = 2 (0 = no discharge, 1 = clear thin discharge, 2 = thick purulent discharge)    Left Nasal Cavity and Paranasal Sinuses:    Polyp score = 0 (0 = no polyps, 1 = small polyps in middle meatus not reaching below the inferior border of the middle radu, 2 = polyps reaching below the middle border of the middle turbinate, 3= large polyps reaching the lower border of the inferior turbinate or polyps medial to the middle radu, 4= large polyps causing almost complete congestion/obstruction of the interior meatus)  Edema score = 1 (0 = absent, 1 = mild, 2 = severe)  Discharge score = 1 (0 = no discharge, 1 = clear thin discharge, 2 = thick purulent discharge)    Septum: intact and deviated high septal deviation with swell body  Other:   -The inferior and middle turbinates were examined. The middle meatus, and sphenoethmoid recess was examined bilaterally.    -The nasal septum is straight. There is improvement in the nasal valves bilaterally with increased nasal airway passages. There was a small crust from the head of the inferior turbinate on the right which was removed.   The left nasal cavity was inspected and there was a small amount of crusting at the site of the septoplasty incision and no significant crusting about the inferior turbinate on the left. No evidence of any site of bleeding. Tolerated well without complication. I attest that I was present for and did the entire procedure myself. This note was generated completely or in part utilizing Dragon dictation speech recognition software. Occasionally, words are mistranscribed and despite editing, the text may contain inaccuracies due to incorrect word recognition. If further clarification is needed please contact the office at [phone]. An electronic signature was used to authenticate this note.     --Yovana Molina MD

## 2021-11-03 ENCOUNTER — OFFICE VISIT (OUTPATIENT)
Dept: ENT CLINIC | Age: 36
End: 2021-11-03
Payer: OTHER GOVERNMENT

## 2021-11-03 VITALS — WEIGHT: 247 LBS | HEIGHT: 68 IN | BODY MASS INDEX: 37.44 KG/M2

## 2021-11-03 DIAGNOSIS — J31.0 CHRONIC RHINITIS: ICD-10-CM

## 2021-11-03 DIAGNOSIS — J34.2 DEVIATED NASAL SEPTUM: ICD-10-CM

## 2021-11-03 DIAGNOSIS — J34.89 NASAL VALVE COLLAPSE: Primary | ICD-10-CM

## 2021-11-03 DIAGNOSIS — R04.0 EPISTAXIS: ICD-10-CM

## 2021-11-03 DIAGNOSIS — J34.89 NASAL OBSTRUCTION: ICD-10-CM

## 2021-11-03 PROCEDURE — 31231 NASAL ENDOSCOPY DX: CPT | Performed by: STUDENT IN AN ORGANIZED HEALTH CARE EDUCATION/TRAINING PROGRAM

## 2021-11-03 PROCEDURE — 99214 OFFICE O/P EST MOD 30 MIN: CPT | Performed by: STUDENT IN AN ORGANIZED HEALTH CARE EDUCATION/TRAINING PROGRAM

## 2021-11-03 RX ORDER — AZELASTINE 1 MG/ML
2 SPRAY, METERED NASAL 2 TIMES DAILY
Qty: 30 ML | Refills: 1 | Status: SHIPPED | OUTPATIENT
Start: 2021-11-03 | End: 2022-05-20
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
## 2021-11-18 PROBLEM — E66.09 OBESITY DUE TO EXCESS CALORIES WITH SERIOUS COMORBIDITY: Status: ACTIVE | Noted: 2021-11-18

## 2021-11-18 NOTE — PROGRESS NOTES
Date of Visit:  2021    CC: Doc Amato (: 1985) is a 31 y.o. female, established patient, here for evaluation/re-evaluation of the following medical concerns:    ASSESSMENT/PLAN:  Essential hypertension  Assessment & Plan:  Poorly controlled on calibrated home cuff with discontinuation of labetalol several months ago- will restart at prior dose. She will continue to monitor at home and message with updated readings in about 2 weeks. She will continue to work on the [employer]. Orders:  -     Comprehensive Metabolic Panel, Fasting; Future  Mixed hyperlipidemia  Assessment & Plan:  Familial- will likely need medication after childbearing complete. Patient counseled on diet and exercise. Orders:  -     Lipid, Fasting; Future  Class 2 severe obesity due to excess calories with serious comorbidity and body mass index (BMI) of 38.0 to 38.9 in [facility])  Assessment & Plan:  No progress. DASH diet should be effective for weight loss as well as BP control. Return in about 6 months (around 2022). Vitals:    21 1451   BP: 132/88   Pulse: 87   Resp: 16   SpO2: 98%   Weight: 250 lb (113.4 kg)   Height: 5' 8\" (1.727 m)      Estimated body mass index is 38.01 kg/m² as calculated from the following:    Height as of this encounter: 5' 8\" (1.727 m). Weight as of this encounter: 250 lb (113.4 kg). Wt Readings from Last 3 Encounters:   21 250 lb (113.4 kg)   21 247 lb (112 kg)   21 248 lb (112.5 kg)     BP Readings from Last 3 Encounters:   21 132/88   21 (!) 140/86   21 122/76     HPI  Hypertension:  Home blood pressure monitoring: Yes - 140-160/85-95. She is not adherent to a low sodium diet, but working on the [employer]. Patient complains of headache and tinnitus. No CP, dizziness, palpitations. Off labetalol since July. Antihypertensive medication side effects: transient itchy scalp.   Use of agents associated with hypertension: occasional NSAIDS. Hyperlipidemia/obesity: Has seen lipid specialist who recommended waiting to treat until childbearing complete. Father and brother on aggressive lipid-lowering medication regimen. PGF  of MI at 43. Patient trying to follow Mediterranean Diet, but not able to exercise much. No significant change in weight. ROS  As documented above    Physical Exam  Constitutional:       General: She is not in acute distress. Appearance: She is well-developed. Eyes:      Conjunctiva/sclera: Conjunctivae normal.   Neck:      Thyroid: No thyroid mass or thyromegaly. Cardiovascular:      Rate and Rhythm: Normal rate and regular rhythm. Heart sounds: Normal heart sounds. No murmur heard. No friction rub. No gallop. Pulmonary:      Effort: Pulmonary effort is normal. No respiratory distress. Breath sounds: Normal breath sounds. No wheezing, rhonchi or rales. Lymphadenopathy:      Cervical: No cervical adenopathy. Skin:     General: Skin is warm and dry. Findings: No erythema or rash. Neurological:      Mental Status: She is alert and oriented to person, place, and time. Psychiatric:         Speech: Speech normal.         Behavior: Behavior normal.         Thought Content: Thought content normal.         Judgment: Judgment normal.       On this date 2021 I have spent 30 minutes reviewing previous notes, test results and face to face with the patient discussing the diagnosis and importance of compliance with the treatment plan as well as documenting on the day of the visit. --Tiny Mckenna MD on 2021 at 3:30 PM    An electronic signature was used to authenticate this note.

## 2021-11-19 ENCOUNTER — OFFICE VISIT (OUTPATIENT)
Dept: INTERNAL MEDICINE CLINIC | Age: 36
End: 2021-11-19
Payer: OTHER GOVERNMENT

## 2021-11-19 VITALS
HEART RATE: 87 BPM | SYSTOLIC BLOOD PRESSURE: 132 MMHG | DIASTOLIC BLOOD PRESSURE: 88 MMHG | HEIGHT: 68 IN | BODY MASS INDEX: 37.89 KG/M2 | WEIGHT: 250 LBS | OXYGEN SATURATION: 98 % | RESPIRATION RATE: 16 BRPM

## 2021-11-19 DIAGNOSIS — E66.01 CLASS 2 SEVERE OBESITY DUE TO EXCESS CALORIES WITH SERIOUS COMORBIDITY AND BODY MASS INDEX (BMI) OF 38.0 TO 38.9 IN ADULT (HCC): ICD-10-CM

## 2021-11-19 DIAGNOSIS — E78.2 MIXED HYPERLIPIDEMIA: ICD-10-CM

## 2021-11-19 DIAGNOSIS — I10 ESSENTIAL HYPERTENSION: Primary | ICD-10-CM

## 2021-11-19 PROCEDURE — 99214 OFFICE O/P EST MOD 30 MIN: CPT | Performed by: INTERNAL MEDICINE

## 2021-11-19 RX ORDER — LABETALOL 300 MG/1
300 TABLET, FILM COATED ORAL 2 TIMES DAILY
Qty: 180 TABLET | Refills: 1 | Status: SHIPPED | OUTPATIENT
Start: 2021-11-19

## 2021-11-19 SDOH — HEALTH STABILITY: PHYSICAL HEALTH: ON AVERAGE, HOW MANY MINUTES DO YOU ENGAGE IN EXERCISE AT THIS LEVEL?: 0 MIN

## 2021-11-19 SDOH — ECONOMIC STABILITY: TRANSPORTATION INSECURITY
IN THE PAST 12 MONTHS, HAS THE LACK OF TRANSPORTATION KEPT YOU FROM MEDICAL APPOINTMENTS OR FROM GETTING MEDICATIONS?: NO

## 2021-11-19 SDOH — ECONOMIC STABILITY: FOOD INSECURITY: WITHIN THE PAST 12 MONTHS, THE FOOD YOU BOUGHT JUST DIDN'T LAST AND YOU DIDN'T HAVE MONEY TO GET MORE.: NEVER TRUE

## 2021-11-19 SDOH — ECONOMIC STABILITY: HOUSING INSECURITY
IN THE LAST 12 MONTHS, WAS THERE A TIME WHEN YOU DID NOT HAVE A STEADY PLACE TO SLEEP OR SLEPT IN A SHELTER (INCLUDING NOW)?: NO

## 2021-11-19 SDOH — ECONOMIC STABILITY: TRANSPORTATION INSECURITY
IN THE PAST 12 MONTHS, HAS LACK OF TRANSPORTATION KEPT YOU FROM MEETINGS, WORK, OR FROM GETTING THINGS NEEDED FOR DAILY LIVING?: NO

## 2021-11-19 SDOH — ECONOMIC STABILITY: INCOME INSECURITY: IN THE LAST 12 MONTHS, WAS THERE A TIME WHEN YOU WERE NOT ABLE TO PAY THE MORTGAGE OR RENT ON TIME?: NO

## 2021-11-19 SDOH — HEALTH STABILITY: PHYSICAL HEALTH: ON AVERAGE, HOW MANY DAYS PER WEEK DO YOU ENGAGE IN MODERATE TO STRENUOUS EXERCISE (LIKE A BRISK WALK)?: 0 DAYS

## 2021-11-19 SDOH — ECONOMIC STABILITY: FOOD INSECURITY: WITHIN THE PAST 12 MONTHS, YOU WORRIED THAT YOUR FOOD WOULD RUN OUT BEFORE YOU GOT MONEY TO BUY MORE.: NEVER TRUE

## 2021-11-19 ASSESSMENT — SOCIAL DETERMINANTS OF HEALTH (SDOH)
WITHIN THE LAST YEAR, HAVE YOU BEEN AFRAID OF YOUR PARTNER OR EX-PARTNER?: NO
WITHIN THE LAST YEAR, HAVE TO BEEN RAPED OR FORCED TO HAVE ANY KIND OF SEXUAL ACTIVITY BY YOUR PARTNER OR EX-PARTNER?: NO
HOW OFTEN DO YOU ATTEND CHURCH OR RELIGIOUS SERVICES?: NEVER
HOW OFTEN DO YOU GET TOGETHER WITH FRIENDS OR RELATIVES?: THREE TIMES A WEEK
IN A TYPICAL WEEK, HOW MANY TIMES DO YOU TALK ON THE PHONE WITH FAMILY, FRIENDS, OR NEIGHBORS?: THREE TIMES A WEEK
HOW OFTEN DO YOU ATTENT MEETINGS OF THE CLUB OR ORGANIZATION YOU BELONG TO?: NEVER
WITHIN THE LAST YEAR, HAVE YOU BEEN KICKED, HIT, SLAPPED, OR OTHERWISE PHYSICALLY HURT BY YOUR PARTNER OR EX-PARTNER?: NO
DO YOU BELONG TO ANY CLUBS OR ORGANIZATIONS SUCH AS CHURCH GROUPS UNIONS, FRATERNAL OR ATHLETIC GROUPS, OR SCHOOL GROUPS?: NO
WITHIN THE LAST YEAR, HAVE YOU BEEN HUMILIATED OR EMOTIONALLY ABUSED IN OTHER WAYS BY YOUR PARTNER OR EX-PARTNER?: NO
HOW HARD IS IT FOR YOU TO PAY FOR THE VERY BASICS LIKE FOOD, HOUSING, MEDICAL CARE, AND HEATING?: NOT HARD AT ALL

## 2021-11-19 NOTE — ASSESSMENT & PLAN NOTE
Poorly controlled on calibrated home cuff with discontinuation of labetalol several months ago- will restart at prior dose. She will continue to monitor at home and message with updated readings in about 2 weeks. She will continue to work on the [employer].

## 2021-12-10 ENCOUNTER — TELEMEDICINE (OUTPATIENT)
Dept: INTERNAL MEDICINE CLINIC | Age: 36
End: 2021-12-10
Payer: OTHER GOVERNMENT

## 2021-12-10 DIAGNOSIS — J06.9 VIRAL URI: Primary | ICD-10-CM

## 2021-12-10 PROCEDURE — 99213 OFFICE O/P EST LOW 20 MIN: CPT | Performed by: INTERNAL MEDICINE

## 2021-12-10 NOTE — LETTER
[facility]  [address] [place] [id number]  Phone: [phone]  Fax: [phone]    Dinesh Moses MD        December 10, 2021     Patient: Kj Martínez   YOB: 1985   Date of Visit: 12/10/2021       To Whom it May Concern:    Sydnie Tadeo was seen by me on 12/10/2021. She is in quarantine and cannot work tomorrow.     Sincerely,     Dinesh Moses MD   Electronically signed by Dinesh Moses MD on 12/10/2021 at 11:46 AM

## 2021-12-10 NOTE — PROGRESS NOTES
Date of Visit:  12/10/2021    CC: Jasen Medina (: 1985) is a 31 y.o. female, established patient, here for evaluation/re-evaluation of the following medical concerns:    ASSESSMENT/PLAN:  Viral URI  Supportive care guidelines provided. She will obtain COVID PCR test and quarantine until results available. She will call if purulent nasal discharge or sputum develops, or if condition otherwise worsens. Patient-Reported Vitals 2021   Patient-Reported Weight 240   Patient-Reported Systolic 785   Patient-Reported Diastolic 99   Patient-Reported Pulse 98        Wt Readings from Last 3 Encounters:   21 250 lb (113.4 kg)   21 247 lb (112 kg)   21 248 lb (112.5 kg)     BP Readings from Last 3 Encounters:   21 132/88   21 (!) 140/86   21 122/76     Estimated body mass index is 38.01 kg/m² as calculated from the following:    Height as of 21: 5' 8\" (1.727 m). Weight as of 21: 250 lb (113.4 kg). HPI  Respiratory Symptoms:  Patient complains of 2 day(s) history of fever: 100.5-101.9, myalgias/arthralgias, clear nasal discharge, nasal congestion, facial pain/sinus pressure: bilateral maxillary, sore throat and productive cough: Sputum is white. Symptoms have been worsening with time. She denies any other symptoms . Relevant PMH: allergic rhinitis. Smoking history:  She  reports that she has never smoked. She has never used smokeless tobacco. She has had ill contacts with URI symptoms. Treatment to date: Acetaminophen, Nyquil. Flu shot, but no COVID vaccine. ROS  As documented above    Physical Exam  Constitutional:       General: She is not in acute distress. Appearance: She is well-developed. HENT:      Head: Normocephalic and atraumatic. Mouth/Throat:      Lips: No lesions. Neck:      Comments: No visible mass  Pulmonary:      Effort: Pulmonary effort is normal. No respiratory distress.    Skin:     Comments: No rash, erythema or other discoloration on facial skin and exposed areas of neck and upper extremites    Neurological:      Mental Status: She is alert. Comments: No facial asymmetry (cranial nerve 7 motor function) or gaze palsy   Psychiatric:         Attention and Perception: Attention normal.         Mood and Affect: Mood normal.         Speech: Speech normal.         Behavior: Behavior normal.         Thought Content: Thought content normal.         Cognition and Memory: Cognition normal.           Sandra Landers is a 31 y.o. female being evaluated by a Virtual Visit (video visit) encounter to address concerns as mentioned above. A caregiver was present when appropriate. Due to this being a TeleHealth encounter (During [facility]- public health emergency), evaluation of the following organ systems was limited: Vitals/Constitutional/EENT/Resp/CV/GI//MS/Neuro/Skin/Heme-Lymph-Imm. Pursuant to the emergency declaration under the [address] [place] [address] authority and the [employer] Act, this Virtual Visit was conducted with patient's (and/or legal guardian's) consent, to reduce the patient's risk of exposure to COVID-19 and provide necessary medical care. The patient (and/or legal guardian) has also been advised to contact this office for worsening conditions or problems, and seek emergency medical treatment and/or call 911 if deemed necessary. Patient identification was verified at the start of the visit: Yes    Services were provided through a video synchronous discussion virtually to substitute for in-person clinic visit. Patient and provider were located at their individual homes. --Gabo Lindsey MD on 12/10/2021 at 11:50 AM    An electronic signature was used to authenticate this note.

## 2021-12-16 ENCOUNTER — PATIENT MESSAGE (OUTPATIENT)
Dept: INTERNAL MEDICINE CLINIC | Age: 36
End: 2021-12-16

## 2021-12-16 RX ORDER — DOXYCYCLINE HYCLATE 100 MG
100 TABLET ORAL 2 TIMES DAILY
Qty: 20 TABLET | Refills: 0 | Status: SHIPPED | OUTPATIENT
Start: 2021-12-16 | End: 2021-12-26

## 2021-12-16 NOTE — TELEPHONE ENCOUNTER
From: Soraida Garcia  To: Dr. Petersen Slough: 12/16/2021 8:41 AM EST  Subject: Sinus infection    Myranda,   You told me on Friday to message you if I starting getting the wonderful bright green out of my nose throughout the whole day and I am. I'm pretty sure this has turned into a sinus infection as well     Thanks   Luis Kapoor

## 2022-01-11 ENCOUNTER — TELEPHONE (OUTPATIENT)
Dept: INTERNAL MEDICINE CLINIC | Age: 37
End: 2022-01-11

## 2022-01-11 DIAGNOSIS — Z11.59 SCREENING FOR VIRAL DISEASE: Primary | ICD-10-CM

## 2022-01-11 NOTE — TELEPHONE ENCOUNTER
Patient has been experiencing loss of taste and smell as well as fever x 2 days. She has been scheduled for COVID/Flu testing tomorrow. Please place orders.

## 2022-05-03 ENCOUNTER — OFFICE VISIT (OUTPATIENT)
Dept: ENT CLINIC | Age: 37
End: 2022-05-03
Payer: OTHER GOVERNMENT

## 2022-05-03 VITALS
HEART RATE: 83 BPM | WEIGHT: 234 LBS | SYSTOLIC BLOOD PRESSURE: 134 MMHG | DIASTOLIC BLOOD PRESSURE: 88 MMHG | HEIGHT: 68 IN | BODY MASS INDEX: 35.46 KG/M2

## 2022-05-03 DIAGNOSIS — J31.0 CHRONIC RHINITIS: ICD-10-CM

## 2022-05-03 DIAGNOSIS — J34.2 DEVIATED NASAL SEPTUM: ICD-10-CM

## 2022-05-03 DIAGNOSIS — J34.89 NASAL VALVE COLLAPSE: Primary | ICD-10-CM

## 2022-05-03 DIAGNOSIS — J34.89 NASAL OBSTRUCTION: ICD-10-CM

## 2022-05-03 PROCEDURE — 99213 OFFICE O/P EST LOW 20 MIN: CPT | Performed by: STUDENT IN AN ORGANIZED HEALTH CARE EDUCATION/TRAINING PROGRAM

## 2022-05-03 PROCEDURE — 31231 NASAL ENDOSCOPY DX: CPT | Performed by: STUDENT IN AN ORGANIZED HEALTH CARE EDUCATION/TRAINING PROGRAM

## 2022-05-03 ASSESSMENT — ENCOUNTER SYMPTOMS: SINUS COMPLAINT: 1
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
## 2022-05-18 NOTE — PROGRESS NOTES
Date of Visit:  2022    CC: Van Villalobos (: 1985) is a 31 y.o. female, established patient, here for evaluation/re-evaluation of the following medical concerns:    ASSESSMENT/PLAN:  Essential hypertension  Assessment & Plan:  Well-controlled on lower dose of labetalol, but will likely require further titration as her pregnancy progresses. Mixed hyperlipidemia  Assessment & Plan:  Familial- will likely need medication after childbearing complete. Will recheck lipids 2-3 months post-partum. 24 weeks gestation of pregnancy  Assessment & Plan:  No complications thus far, but will need very close monitoring of BP. Seeing high risk OB, but will provide assistance, if needed. Routine glucose tolerance test pending. Return in about 6 months (around 2022). Vitals:    22 0923   BP: 118/70   Pulse: 97   SpO2: 99%   Weight: 239 lb 9.6 oz (108.7 kg)   Height: 5' 8\" (1.727 m)      Estimated body mass index is 36.43 kg/m² as calculated from the following:    Height as of this encounter: 5' 8\" (1.727 m). Weight as of this encounter: 239 lb 9.6 oz (108.7 kg). Wt Readings from Last 3 Encounters:   22 239 lb 9.6 oz (108.7 kg)   22 234 lb (106.1 kg)   21 250 lb (113.4 kg)     BP Readings from Last 3 Encounters:   22 118/70   22 134/88   21 132/88     HPI  Hypertension:  Home blood pressure monitoring: Yes - 110-120/70 on 150 mg twice daily during pregnancy but titrates according to readings- about 24 weeks. She is not adherent to a low sodium diet, but working on the [employer]. No CP, dizziness, palpitations. Only having occasional HAs and tinnitus. Antihypertensive medication side effects: transient itchy scalp. Use of agents associated with hypertension: none. Hyperlipidemia: Has seen lipid specialist who recommended waiting to treat until childbearing complete. Father and brother on aggressive lipid-lowering medication regimen.   PGF  of MI at 43. Patient trying to follow Mediterranean Diet, but not able to exercise much. 25 weeks pregnant- no complications. ROS  As documented above    Physical Exam  Constitutional:       General: She is not in acute distress. Appearance: She is well-developed. Eyes:      Conjunctiva/sclera: Conjunctivae normal.   Neck:      Thyroid: No thyroid mass or thyromegaly. Cardiovascular:      Rate and Rhythm: Normal rate and regular rhythm. Heart sounds: Normal heart sounds. No murmur heard. No friction rub. No gallop. Pulmonary:      Effort: Pulmonary effort is normal. No respiratory distress. Breath sounds: Normal breath sounds. No wheezing, rhonchi or rales. Abdominal:      Comments: Gravid abdomen   Musculoskeletal:      Right lower leg: Edema (trace) present. Left lower leg: Edema (trace) present. Lymphadenopathy:      Cervical: No cervical adenopathy. Skin:     General: Skin is warm and dry. Findings: No erythema or rash. Neurological:      Mental Status: She is alert and oriented to person, place, and time. Psychiatric:         Speech: Speech normal.         Behavior: Behavior normal.         Thought Content: Thought content normal.         Judgment: Judgment normal.       On this date 5/20/2022 I have spent 30 minutes reviewing previous notes, test results and face to face with the patient discussing the diagnosis and importance of compliance with the treatment plan as well as documenting on the day of the visit. --Jas Munson MD on 5/22/2022 at 1:32 PM    An electronic signature was used to authenticate this note.

## 2022-05-20 ENCOUNTER — OFFICE VISIT (OUTPATIENT)
Dept: INTERNAL MEDICINE CLINIC | Age: 37
End: 2022-05-20
Payer: OTHER GOVERNMENT

## 2022-05-20 VITALS
HEIGHT: 68 IN | HEART RATE: 97 BPM | DIASTOLIC BLOOD PRESSURE: 70 MMHG | OXYGEN SATURATION: 99 % | WEIGHT: 239.6 LBS | BODY MASS INDEX: 36.31 KG/M2 | SYSTOLIC BLOOD PRESSURE: 118 MMHG

## 2022-05-20 DIAGNOSIS — Z3A.24 24 WEEKS GESTATION OF PREGNANCY: ICD-10-CM

## 2022-05-20 DIAGNOSIS — E78.2 MIXED HYPERLIPIDEMIA: ICD-10-CM

## 2022-05-20 DIAGNOSIS — I10 ESSENTIAL HYPERTENSION: Primary | ICD-10-CM

## 2022-05-20 PROCEDURE — 99214 OFFICE O/P EST MOD 30 MIN: CPT | Performed by: INTERNAL MEDICINE

## 2022-05-20 ASSESSMENT — PATIENT HEALTH QUESTIONNAIRE - PHQ9
SUM OF ALL RESPONSES TO PHQ QUESTIONS 1-9: 0
SUM OF ALL RESPONSES TO PHQ9 QUESTIONS 1 & 2: 0
1. LITTLE INTEREST OR PLEASURE IN DOING THINGS: 0
2. FEELING DOWN, DEPRESSED OR HOPELESS: 0
SUM OF ALL RESPONSES TO PHQ QUESTIONS 1-9: 0

## 2022-05-22 PROBLEM — Z34.90 PREGNANCY: Status: ACTIVE | Noted: 2022-05-22

## 2022-05-22 NOTE — ASSESSMENT & PLAN NOTE
Familial- will likely need medication after childbearing complete. Will recheck lipids 2-3 months post-partum.

## 2022-05-22 NOTE — ASSESSMENT & PLAN NOTE
Well-controlled on lower dose of labetalol, but will likely require further titration as her pregnancy progresses.

## 2022-05-22 NOTE — ASSESSMENT & PLAN NOTE
No complications thus far, but will need very close monitoring of BP. Seeing high risk OB, but will provide assistance, if needed. Routine glucose tolerance test pending.

## 2022-11-03 ENCOUNTER — TELEPHONE (OUTPATIENT)
Dept: INTERNAL MEDICINE CLINIC | Age: 37
End: 2022-11-03

## 2022-11-03 NOTE — TELEPHONE ENCOUNTER
Typically this is managed by warm compresses and more breast feeding or pumping, but sometimes antibiotics are needed. This is typically managed by OB/GTN. Did she try calling them?

## 2022-11-03 NOTE — TELEPHONE ENCOUNTER
Pt was advised and understood. She spiked a fever last night . No new symptoms today.  She was advised to contact her OBGYN

## 2022-11-03 NOTE — TELEPHONE ENCOUNTER
Patient believes she has an infected milk duct in left breast. Patient is breastfeeding and states that yesterday around 4AM she noticed a large lump that is hard and swollen in her left breast. She states it is painful. Patient believes she got it unclogged last night but is concerned about the infection. Please contact patient to further advise.

## 2023-11-10 PROBLEM — G47.33 OBSTRUCTIVE SLEEP APNEA SYNDROME: Status: ACTIVE | Noted: 2023-11-10

## 2023-11-10 PROBLEM — Z34.90 PREGNANT: Status: RESOLVED | Noted: 2022-05-22 | Resolved: 2023-11-10

## 2024-11-15 ENCOUNTER — OFFICE VISIT (OUTPATIENT)
Age: 39
End: 2024-11-15

## 2024-11-15 VITALS
DIASTOLIC BLOOD PRESSURE: 81 MMHG | SYSTOLIC BLOOD PRESSURE: 119 MMHG | HEART RATE: 92 BPM | RESPIRATION RATE: 18 BRPM | OXYGEN SATURATION: 96 % | WEIGHT: 241.2 LBS | TEMPERATURE: 97.8 F | BODY MASS INDEX: 36.56 KG/M2 | HEIGHT: 68 IN

## 2024-11-15 DIAGNOSIS — J01.40 ACUTE NON-RECURRENT PANSINUSITIS: Primary | ICD-10-CM

## 2024-11-15 DIAGNOSIS — J40 BRONCHITIS: ICD-10-CM

## 2024-11-15 RX ORDER — LEVOFLOXACIN 750 MG/1
750 TABLET, FILM COATED ORAL DAILY
Qty: 10 TABLET | Refills: 0 | Status: SHIPPED | OUTPATIENT
Start: 2024-11-15 | End: 2024-11-25

## 2024-11-15 RX ORDER — PREDNISONE 20 MG/1
20 TABLET ORAL 2 TIMES DAILY
Qty: 10 TABLET | Refills: 0 | Status: SHIPPED | OUTPATIENT
Start: 2024-11-15 | End: 2024-11-20

## 2024-11-15 ASSESSMENT — ENCOUNTER SYMPTOMS
TROUBLE SWALLOWING: 0
CHEST TIGHTNESS: 1
APNEA: 0
COLOR CHANGE: 0
SINUS PRESSURE: 1
STRIDOR: 0
CHOKING: 0
COUGH: 1
SINUS PAIN: 1
EYES NEGATIVE: 1
RHINORRHEA: 1
NAUSEA: 0
SORE THROAT: 0
WHEEZING: 1
VOICE CHANGE: 0
ABDOMINAL PAIN: 0
SHORTNESS OF BREATH: 0

## 2024-11-15 NOTE — PROGRESS NOTES
Sitting, Cuff Size: Large Adult)   Pulse 92   Temp 97.8 °F (36.6 °C)   Resp 18   Ht 1.727 m (5' 8\")   Wt 109.4 kg (241 lb 3.2 oz)   SpO2 96%   BMI 36.67 kg/m²     Assessment:      Diagnosis Orders   1. Acute non-recurrent pansinusitis  levoFLOXacin (LEVAQUIN) 750 MG tablet    Pseudoephedrine-DM-GG 60- MG TABS      2. Bronchitis  levoFLOXacin (LEVAQUIN) 750 MG tablet    predniSONE (DELTASONE) 20 MG tablet    Pseudoephedrine-DM-GG 60- MG TABS          Plan:    No orders of the defined types were placed in this encounter.      Return if symptoms worsen or fail to improve.    Orders Placed This Encounter   Medications    levoFLOXacin (LEVAQUIN) 750 MG tablet     Sig: Take 1 tablet by mouth daily for 10 days     Dispense:  10 tablet     Refill:  0    predniSONE (DELTASONE) 20 MG tablet     Sig: Take 1 tablet by mouth 2 times daily for 5 days     Dispense:  10 tablet     Refill:  0    Pseudoephedrine-DM-GG 60- MG TABS     Sig: Take 1 tablet by mouth every 6-8 hours as needed (Congestion/Sinusitis/cough)     Dispense:  30 tablet     Refill:  0     Discussed diagnosis with patient  Discussed plan of care: patient agrees with plan  Discussed proper use of medications  Discussed signs of adverse reaction to medication  Discussed lifestyle modifications that may be beneficial for their symptoms.  Reviewed health maintenance.     Discussed seeking immediate medical attention if   Worsening of symptoms with treatment  Shortness of breath develops  Chest pain develops especially at rest, with back or arm pain (call 911)     Patient given educationalmaterials - see patient instructions.    All patient questions answered.    Pt voiced understanding.  Follow up as directed.          Patient Instructions   Take medications as prescribed    Increase hydration and rest    Saline spray or Flonase nasal spray to help relief nasal congestion    Sleep with head of bed elevated to help you breathe better    Avoid

## 2024-11-15 NOTE — PATIENT INSTRUCTIONS
Take medications as prescribed    Increase hydration and rest    Saline spray or Flonase nasal spray to help relief nasal congestion    Sleep with head of bed elevated to help you breathe better    Avoid exposure to cigarette smoke or fumes    Avoid caffeine and alcohol    May use tylenol alternate with ibuprofen for discomfort or fever

## (undated) DEVICE — INTENDED FOR TISSUE SEPARATION, AND OTHER PROCEDURES THAT REQUIRE A SHARP SURGICAL BLADE TO PUNCTURE OR CUT.: Brand: BARD-PARKER ® STAINLESS STEEL BLADES

## (undated) DEVICE — SUTURE PDS II SZ 5-0 L18IN ABSRB UD P-3 L13MM 3/8 CIR PRIM Z493G

## (undated) DEVICE — MASTISOL ADHESIVE LIQ 2/3ML

## (undated) DEVICE — BIPOLAR 23-321-1 AQM MIS FLEX CE: Brand: AQUAMANTYS™

## (undated) DEVICE — DRAPE EENT SPLIT

## (undated) DEVICE — STERILE POLYISOPRENE POWDER-FREE SURGICAL GLOVES: Brand: PROTEXIS

## (undated) DEVICE — INSTRUMENT TRACKER 9733533XOM ENT 1PK

## (undated) DEVICE — BLADE 1882940HR 5PK M4 INF TURB 2.9MM: Brand: STRAIGHTSHOT

## (undated) DEVICE — MEDICINE CUP, GRADUATED, STER: Brand: MEDLINE

## (undated) DEVICE — STAPLER SKIN H3.9MM WIRE DIA0.58MM CRWN 6.9MM 35 STPL ROT

## (undated) DEVICE — SUTURE PROL SZ 3-0 L18IN NONABSORBABLE BLU L19MM FS-2 3/8 8665G

## (undated) DEVICE — SPONGES GAUZE X-RAY 4X4 16PLY

## (undated) DEVICE — TUBING 1895522 5PK STRAIGHTSHOT TO XPS: Brand: STRAIGHTSHOT®

## (undated) DEVICE — 3M™ STERI-DRAPE™ INSTRUMENT POUCH 1018: Brand: STERI-DRAPE™

## (undated) DEVICE — CODMAN® SURGICAL PATTIES 1/2" X 3" (1.27CM X 7.62CM): Brand: CODMAN®

## (undated) DEVICE — DRAPE THER FLUID WARMING 66X44 IN FLAT SLUSH DBL DISC ORS

## (undated) DEVICE — SOLUTION IV IRRIG 500ML 0.9% SODIUM CHL 2F7123

## (undated) DEVICE — GAUZE,SPONGE,4"X4",8PLY,STRL,LF,10/TRAY: Brand: MEDLINE

## (undated) DEVICE — SUTURE PLN GUT SZ 4-0 L18IN ABSRB YELLOWISH TAN L13MM SC-1 1828H

## (undated) DEVICE — SUTURE CHROMIC GUT SZ 4-0 L18IN ABSRB BRN L19MM PS-2 3/8 1637G

## (undated) DEVICE — SKIN MARKER,REGULAR TIP WITH RULER: Brand: DEVON

## (undated) DEVICE — TOWEL,OR,DSP,ST,BLUE,STD,4/PK,20PK/CS: Brand: MEDLINE

## (undated) DEVICE — SYRINGE MED 10ML TRNSLUC BRL PLUNG BLK MRK POLYPR CTRL

## (undated) DEVICE — GLOVE ORTHO 7 1/2   MSG9475

## (undated) DEVICE — SUTURE CHROMIC GUT SZ 4-0 L18IN ABSRB BRN L13MM P-3 3/8 CIR 1654G

## (undated) DEVICE — PACK PROCEDURE SURG EXTREMITY MFFOP CUST

## (undated) DEVICE — SPLINT 1524055 DOYLE II AIRWAY SET: Brand: DOYLE II ™

## (undated) DEVICE — KIT,ANTI FOG,W/SPONGE & FLUID,SOFT PACK: Brand: MEDLINE

## (undated) DEVICE — PATIENT TRACKER 9734887XOM NON-INVASIVE

## (undated) DEVICE — BLANKET WRM W40.2XL55.9IN IORT LO BODY + MISTRAL AIR

## (undated) DEVICE — TUBING, SUCTION, 3/16" X 12', STRAIGHT: Brand: MEDLINE

## (undated) DEVICE — HYPODERMIC SAFETY NEEDLE: Brand: MAGELLAN

## (undated) DEVICE — CORD,CAUTERY,BIPOLAR,STERILE: Brand: MEDLINE

## (undated) DEVICE — SYRINGE IRRIG 60ML SFT PLIABLE BLB EZ TO GRP 1 HND USE W/

## (undated) DEVICE — SUTURE PLAIN GUT 60 SE1408 18IN ABSORBABLE MONOFILAMENT G1735K